# Patient Record
Sex: FEMALE | Race: WHITE | NOT HISPANIC OR LATINO | Employment: UNEMPLOYED | URBAN - METROPOLITAN AREA
[De-identification: names, ages, dates, MRNs, and addresses within clinical notes are randomized per-mention and may not be internally consistent; named-entity substitution may affect disease eponyms.]

---

## 2019-01-16 ENCOUNTER — TRANSCRIBE ORDERS (OUTPATIENT)
Dept: ADMINISTRATIVE | Facility: HOSPITAL | Age: 59
End: 2019-01-16

## 2019-01-16 DIAGNOSIS — Z12.39 SCREENING BREAST EXAMINATION: Primary | ICD-10-CM

## 2019-01-22 ENCOUNTER — HOSPITAL ENCOUNTER (OUTPATIENT)
Dept: MAMMOGRAPHY | Facility: HOSPITAL | Age: 59
Discharge: HOME | End: 2019-01-22
Payer: COMMERCIAL

## 2019-01-22 VITALS — BODY MASS INDEX: 39.56 KG/M2 | WEIGHT: 215 LBS | HEIGHT: 62 IN

## 2019-01-22 DIAGNOSIS — Z12.39 SCREENING BREAST EXAMINATION: ICD-10-CM

## 2019-01-22 PROCEDURE — 77067 SCR MAMMO BI INCL CAD: CPT

## 2019-01-28 ENCOUNTER — HOSPITAL ENCOUNTER (OUTPATIENT)
Dept: MAMMOGRAPHY | Facility: CLINIC | Age: 59
Discharge: HOME | End: 2019-01-28
Payer: COMMERCIAL

## 2019-01-28 ENCOUNTER — HOSPITAL ENCOUNTER (OUTPATIENT)
Dept: ULTRASOUND IMAGING | Facility: CLINIC | Age: 59
Discharge: HOME | End: 2019-01-28
Payer: COMMERCIAL

## 2019-01-28 VITALS — WEIGHT: 215 LBS | BODY MASS INDEX: 39.56 KG/M2 | HEIGHT: 62 IN

## 2019-01-28 DIAGNOSIS — R92.8 ABNORMAL MAMMOGRAM: ICD-10-CM

## 2019-01-28 PROCEDURE — G0279 TOMOSYNTHESIS, MAMMO: HCPCS

## 2019-01-28 PROCEDURE — 76642 ULTRASOUND BREAST LIMITED: CPT

## 2019-01-28 PROCEDURE — 77065 DX MAMMO INCL CAD UNI: CPT

## 2023-09-17 ENCOUNTER — HOSPITAL ENCOUNTER (EMERGENCY)
Facility: HOSPITAL | Age: 63
Discharge: HOME | End: 2023-09-17
Attending: EMERGENCY MEDICINE
Payer: COMMERCIAL

## 2023-09-17 ENCOUNTER — APPOINTMENT (EMERGENCY)
Dept: RADIOLOGY | Facility: HOSPITAL | Age: 63
End: 2023-09-17
Payer: COMMERCIAL

## 2023-09-17 VITALS
HEART RATE: 67 BPM | RESPIRATION RATE: 20 BRPM | BODY MASS INDEX: 46.09 KG/M2 | OXYGEN SATURATION: 97 % | SYSTOLIC BLOOD PRESSURE: 174 MMHG | WEIGHT: 251.99 LBS | DIASTOLIC BLOOD PRESSURE: 84 MMHG | TEMPERATURE: 97.9 F

## 2023-09-17 DIAGNOSIS — R53.1 GENERALIZED WEAKNESS: Primary | ICD-10-CM

## 2023-09-17 DIAGNOSIS — R03.0 ELEVATED BLOOD PRESSURE READING: ICD-10-CM

## 2023-09-17 LAB
ALBUMIN SERPL BCP-MCNC: 4.2 G/DL (ref 3.5–5)
ALP SERPL-CCNC: 111 U/L (ref 34–104)
ALT SERPL W P-5'-P-CCNC: 19 U/L (ref 7–52)
ANION GAP SERPL CALCULATED.3IONS-SCNC: 10 MMOL/L
AST SERPL W P-5'-P-CCNC: 25 U/L (ref 13–39)
BASOPHILS # BLD AUTO: 0.05 THOUSANDS/ÂΜL (ref 0–0.1)
BASOPHILS NFR BLD AUTO: 1 % (ref 0–1)
BILIRUB SERPL-MCNC: 0.5 MG/DL (ref 0.2–1)
BILIRUB UR QL STRIP: NEGATIVE
BUN SERPL-MCNC: 10 MG/DL (ref 5–25)
CALCIUM SERPL-MCNC: 9.3 MG/DL (ref 8.4–10.2)
CARDIAC TROPONIN I PNL SERPL HS: <2 NG/L
CHLORIDE SERPL-SCNC: 102 MMOL/L (ref 96–108)
CLARITY UR: CLEAR
CO2 SERPL-SCNC: 26 MMOL/L (ref 21–32)
COLOR UR: COLORLESS
CREAT SERPL-MCNC: 0.8 MG/DL (ref 0.6–1.3)
EOSINOPHIL # BLD AUTO: 0.11 THOUSAND/ÂΜL (ref 0–0.61)
EOSINOPHIL NFR BLD AUTO: 1 % (ref 0–6)
ERYTHROCYTE [DISTWIDTH] IN BLOOD BY AUTOMATED COUNT: 15.8 % (ref 11.6–15.1)
FLUAV RNA RESP QL NAA+PROBE: NEGATIVE
FLUBV RNA RESP QL NAA+PROBE: NEGATIVE
GFR SERPL CREATININE-BSD FRML MDRD: 80 ML/MIN/1.73SQ M
GLUCOSE SERPL-MCNC: 151 MG/DL (ref 65–140)
GLUCOSE UR STRIP-MCNC: NEGATIVE MG/DL
HCT VFR BLD AUTO: 42.4 % (ref 34.8–46.1)
HGB BLD-MCNC: 13.2 G/DL (ref 11.5–15.4)
HGB UR QL STRIP.AUTO: NEGATIVE
IMM GRANULOCYTES # BLD AUTO: 0.08 THOUSAND/UL (ref 0–0.2)
IMM GRANULOCYTES NFR BLD AUTO: 1 % (ref 0–2)
KETONES UR STRIP-MCNC: NEGATIVE MG/DL
LEUKOCYTE ESTERASE UR QL STRIP: NEGATIVE
LYMPHOCYTES # BLD AUTO: 1.96 THOUSANDS/ÂΜL (ref 0.6–4.47)
LYMPHOCYTES NFR BLD AUTO: 19 % (ref 14–44)
MCH RBC QN AUTO: 25.6 PG (ref 26.8–34.3)
MCHC RBC AUTO-ENTMCNC: 31.1 G/DL (ref 31.4–37.4)
MCV RBC AUTO: 82 FL (ref 82–98)
MONOCYTES # BLD AUTO: 0.51 THOUSAND/ÂΜL (ref 0.17–1.22)
MONOCYTES NFR BLD AUTO: 5 % (ref 4–12)
NEUTROPHILS # BLD AUTO: 7.43 THOUSANDS/ÂΜL (ref 1.85–7.62)
NEUTS SEG NFR BLD AUTO: 73 % (ref 43–75)
NITRITE UR QL STRIP: NEGATIVE
NRBC BLD AUTO-RTO: 0 /100 WBCS
PH UR STRIP.AUTO: 5.5 [PH]
PLATELET # BLD AUTO: 313 THOUSANDS/UL (ref 149–390)
PMV BLD AUTO: 8.6 FL (ref 8.9–12.7)
POTASSIUM SERPL-SCNC: 3.8 MMOL/L (ref 3.5–5.3)
PROT SERPL-MCNC: 8.2 G/DL (ref 6.4–8.4)
PROT UR STRIP-MCNC: NEGATIVE MG/DL
RBC # BLD AUTO: 5.15 MILLION/UL (ref 3.81–5.12)
RSV RNA RESP QL NAA+PROBE: NEGATIVE
SARS-COV-2 RNA RESP QL NAA+PROBE: NEGATIVE
SODIUM SERPL-SCNC: 138 MMOL/L (ref 135–147)
SP GR UR STRIP.AUTO: 1 (ref 1–1.03)
UROBILINOGEN UR STRIP-ACNC: <2 MG/DL
WBC # BLD AUTO: 10.14 THOUSAND/UL (ref 4.31–10.16)

## 2023-09-17 PROCEDURE — 85025 COMPLETE CBC W/AUTO DIFF WBC: CPT | Performed by: EMERGENCY MEDICINE

## 2023-09-17 PROCEDURE — 84484 ASSAY OF TROPONIN QUANT: CPT | Performed by: EMERGENCY MEDICINE

## 2023-09-17 PROCEDURE — 99285 EMERGENCY DEPT VISIT HI MDM: CPT | Performed by: EMERGENCY MEDICINE

## 2023-09-17 PROCEDURE — 99285 EMERGENCY DEPT VISIT HI MDM: CPT

## 2023-09-17 PROCEDURE — 80053 COMPREHEN METABOLIC PANEL: CPT | Performed by: EMERGENCY MEDICINE

## 2023-09-17 PROCEDURE — 0241U HB NFCT DS VIR RESP RNA 4 TRGT: CPT | Performed by: EMERGENCY MEDICINE

## 2023-09-17 PROCEDURE — 93005 ELECTROCARDIOGRAM TRACING: CPT

## 2023-09-17 PROCEDURE — 81003 URINALYSIS AUTO W/O SCOPE: CPT | Performed by: EMERGENCY MEDICINE

## 2023-09-17 PROCEDURE — 71045 X-RAY EXAM CHEST 1 VIEW: CPT

## 2023-09-17 PROCEDURE — 36415 COLL VENOUS BLD VENIPUNCTURE: CPT

## 2023-09-17 NOTE — ED PROVIDER NOTES
History  Chief Complaint   Patient presents with   • Weakness - Generalized     Reports playing [employer] about an hour ago, started to feel lightheaded and weak. Pt did not pass out. Pt reports she ate and drank this morning. Patient is a 82-year-old female with a history of hyperlipidemia and diabetes who presents with a headedness. Patient states that she felt well upon waking up this morning. She was driving to a corn hole tournament when she developed fatigue and lightheadedness. She states that her symptoms worsened upon arriving at the event. She denies loss of consciousness. Admits to heaviness in both arms, but denies focal weakness. She also denies numbness, tingling, chest pain, shortness of breath, diaphoresis, other concerns. She has not had similar symptoms previously. She states her appetite has been normal.      History provided by:  Patient  Dizziness  Quality:  Lightheadedness  Severity:  Mild  Timing:  Constant  Progression:  Improving  Chronicity:  New  Ineffective treatments:  None tried  Associated symptoms: weakness (generalized)    Associated symptoms: no chest pain, no diarrhea, no headaches, no nausea, no palpitations, no shortness of breath and no vomiting        None       History reviewed. No pertinent past medical history. Past Surgical History:   Procedure Laterality Date   • BREAST BIOPSY         History reviewed. No pertinent family history. I have reviewed and agree with the history as documented. E-Cigarette/Vaping   • E-Cigarette Use Never User      E-Cigarette/Vaping Substances     Social History     Tobacco Use   • Smoking status: Never   • Smokeless tobacco: Never   Vaping Use   • Vaping Use: Never used   Substance Use Topics   • Alcohol use: Not Currently   • Drug use: Never       Review of Systems   Constitutional: Negative for chills, diaphoresis and fever. HENT: Negative for nosebleeds, sore throat and trouble swallowing.     Eyes: Negative for photophobia, pain and visual disturbance. Respiratory: Negative for cough, chest tightness and shortness of breath. Cardiovascular: Negative for chest pain, palpitations and leg swelling. Gastrointestinal: Negative for abdominal pain, constipation, diarrhea, nausea and vomiting. Endocrine: Negative for polydipsia and polyuria. Genitourinary: Negative for difficulty urinating, dysuria, hematuria, pelvic pain, vaginal bleeding and vaginal discharge. Musculoskeletal: Negative for back pain, neck pain and neck stiffness. Skin: Negative for pallor and rash. Neurological: Positive for weakness (generalized) and light-headedness. Negative for dizziness, seizures and headaches. All other systems reviewed and are negative. Physical Exam  Physical Exam  Vitals and nursing note reviewed. Constitutional:       General: She is not in acute distress. Appearance: She is well-developed. HENT:      Head: Normocephalic and atraumatic. Eyes:      Pupils: Pupils are equal, round, and reactive to light. Cardiovascular:      Rate and Rhythm: Normal rate and regular rhythm. Pulses: Normal pulses. Heart sounds: Normal heart sounds. Pulmonary:      Effort: Pulmonary effort is normal. No respiratory distress. Breath sounds: Normal breath sounds. Abdominal:      General: There is no distension. Palpations: Abdomen is soft. Abdomen is not rigid. Tenderness: There is no abdominal tenderness. There is no guarding or rebound. Musculoskeletal:         General: No tenderness. Normal range of motion. Cervical back: Normal range of motion and neck supple. Lymphadenopathy:      Cervical: No cervical adenopathy. Skin:     General: Skin is warm and dry. Capillary Refill: Capillary refill takes less than 2 seconds. Neurological:      Mental Status: She is alert and oriented to person, place, and time. Cranial Nerves: No cranial nerve deficit.       Sensory: No sensory deficit. Motor: Motor function is intact. Gait: Gait is intact. Comments: Normal cerebellar exam.  Speech fluent. Visual fields intact. Vital Signs  ED Triage Vitals   Temperature Pulse Respirations Blood Pressure SpO2   09/17/23 1246 09/17/23 1245 09/17/23 1245 09/17/23 1245 09/17/23 1245   97.9 °F (36.6 °C) 67 20 (!) 174/84 97 %      Temp Source Heart Rate Source Patient Position - Orthostatic VS BP Location FiO2 (%)   09/17/23 1246 09/17/23 1245 -- 09/17/23 1245 --   Oral Monitor  Right arm       Pain Score       09/17/23 1245       No Pain           Vitals:    09/17/23 1245   BP: (!) 174/84   Pulse: 67         Visual Acuity      ED Medications  Medications - No data to display    Diagnostic Studies  Results Reviewed     Procedure Component Value Units Date/Time    FLU/RSV/COVID - if FLU/RSV clinically relevant [[id number]]  (Normal) Collected: 09/17/23 1325    Lab Status: Final result Specimen: Nares from Nose Updated: 09/17/23 1423     SARS-CoV-2 Negative     INFLUENZA A PCR Negative     INFLUENZA B PCR Negative     RSV PCR Negative    Narrative:      FOR PEDIATRIC PATIENTS - copy/paste COVID Guidelines URL to browser: [email or web address]    SARS-CoV-2 assay is a Nucleic Acid Amplification assay intended for the  qualitative detection of nucleic acid from SARS-CoV-2 in nasopharyngeal  swabs. Results are for the presumptive identification of SARS-CoV-2 RNA. Positive results are indicative of infection with SARS-CoV-2, the virus  causing COVID-19, but do not rule out bacterial infection or co-infection  with other viruses. Laboratories within the [facility] and its  territories are required to report all positive results to the appropriate  public health authorities. Negative results do not preclude SARS-CoV-2  infection and should not be used as the sole basis for treatment or other  patient management decisions.  Negative results must be combined with  clinical observations, patient history, and epidemiological information. This test has not been FDA cleared or approved. This test has been authorized by FDA under an Emergency Use Authorization  (EUA). This test is only authorized for the duration of time the  declaration that circumstances exist justifying the authorization of the  emergency use of an in vitro diagnostic tests for detection of SARS-CoV-2  virus and/or diagnosis of COVID-19 infection under section 564(b)(1) of  the Act, 21 U. S.C. 063EGF-1(L)(2), unless the authorization is terminated  or revoked sooner. The test has been validated but independent review by FDA  and CLIA is pending. Test performed using [employer] GeneXpert: This RT-PCR assay targets N2,  a region unique to SARS-CoV-2. A conserved region in the E-gene was chosen  for pan-Sarbecovirus detection which includes SARS-CoV-2. According to CMS-2020-01-R, this platform meets the definition of high-throughput technology.     UA (URINE) with reflex to Scope [[id number]] Collected: 09/17/23 1325    Lab Status: Final result Specimen: Urine, Clean Catch Updated: 09/17/23 1347     Color, UA Colorless     Clarity, UA Clear     Specific Gravity, UA 1.005     pH, UA 5.5     Leukocytes, UA Negative     Nitrite, UA Negative     Protein, UA Negative mg/dl      Glucose, UA Negative mg/dl      Ketones, UA Negative mg/dl      Urobilinogen, UA <2.0 mg/dl      Bilirubin, UA Negative     Occult Blood, UA Negative    HS Troponin 0hr (reflex protocol) [[id number]]  (Normal) Collected: 09/17/23 1300    Lab Status: Final result Specimen: Blood from Arm, Left Updated: 09/17/23 1338     hs TnI 0hr <2 ng/L     Comprehensive metabolic panel [[id number]]  (Abnormal) Collected: 09/17/23 1300    Lab Status: Final result Specimen: Blood from Arm, Left Updated: 09/17/23 1330     Sodium 138 mmol/L      Potassium 3.8 mmol/L      Chloride 102 mmol/L      CO2 26 mmol/L      ANION GAP 10 mmol/L BUN 10 mg/dL      Creatinine 0.80 mg/dL      Glucose 151 mg/dL      Calcium 9.3 mg/dL      AST 25 U/L      ALT 19 U/L      Alkaline Phosphatase 111 U/L      Total Protein 8.2 g/dL      Albumin 4.2 g/dL      Total Bilirubin 0.50 mg/dL      eGFR 80 ml/min/1.73sq m     Narrative:      National Kidney Disease Foundation guidelines for Chronic Kidney Disease (CKD):   •  Stage 1 with normal or high GFR (GFR > 90 mL/min/1.73 square meters)  •  Stage 2 Mild CKD (GFR = 60-89 mL/min/1.73 square meters)  •  Stage 3A Moderate CKD (GFR = 45-59 mL/min/1.73 square meters)  •  Stage 3B Moderate CKD (GFR = 30-44 mL/min/1.73 square meters)  •  Stage 4 Severe CKD (GFR = 15-29 mL/min/1.73 square meters)  •  Stage 5 End Stage CKD (GFR <15 mL/min/1.73 square meters)  Note: GFR calculation is accurate only with a steady state creatinine    CBC and differential [[id number]]  (Abnormal) Collected: 09/17/23 1300    Lab Status: Final result Specimen: Blood from Arm, Left Updated: 09/17/23 1318     WBC 10.14 Thousand/uL      RBC 5.15 Million/uL      Hemoglobin 13.2 g/dL      Hematocrit 42.4 %      MCV 82 fL      MCH 25.6 pg      MCHC 31.1 g/dL      RDW 15.8 %      MPV 8.6 fL      Platelets 417 Thousands/uL      nRBC 0 /100 WBCs      Neutrophils Relative 73 %      Immat GRANS % 1 %      Lymphocytes Relative 19 %      Monocytes Relative 5 %      Eosinophils Relative 1 %      Basophils Relative 1 %      Neutrophils Absolute 7.43 Thousands/µL      Immature Grans Absolute 0.08 Thousand/uL      Lymphocytes Absolute 1.96 Thousands/µL      Monocytes Absolute 0.51 Thousand/µL      Eosinophils Absolute 0.11 Thousand/µL      Basophils Absolute 0.05 Thousands/µL                  XR chest 1 view portable   Final Result by Bernie Cast MD (09/18 1469)      No acute cardiopulmonary disease.       Findings are stable            Workstation performed: [id number]                    Procedures  ECG 12 Lead Documentation Only    Date/Time: 9/17/2023 2:36 PM    Performed by: Mariama Ramirez DO  Authorized by: Mariama Ramirez DO    ECG reviewed by me, the ED Provider: yes    Patient location:  ED  Previous ECG:     Previous ECG:  Compared to current    Similarity:  No change    Comparison to cardiac monitor: Yes    Comments:      Normal sinus rhythm at a rate of 66 bpm.  Normal intervals. Normal axis. Normal QRS. Nonspecific ST-T wave abnormalities. No old for comparison. ED Course                                             Medical Decision Making  Patient presents with an episode of lightheadedness and fatigue. Patient describes bilateral arm weakness, but I did not find any objective weakness on exam.  She has a nonfocal neuro exam, including cerebellar exam.  She ambulates with a steady gait and has normal finger-to-nose. Do not believe that symptoms are consistent with TIA/CVA. Also, no evidence of infectious or metabolic derangement. She does have elevated blood pressure readings in the emergency department. She does not have a history of hypertension. I recommended that she follow-up with her PCP for blood pressure checks. No evidence of endorgan dysfunction in the emergency department. Her symptoms may also be consistent with viral syndrome. She is tolerating p.o. fluids and ambulating without difficulty. She is appropriate for discharge and outpatient follow-up. She agrees to return to ED if symptoms worsen. Portions of the above record have been created with voice recognition software.  Occasional wrong word or "sound alike" substitutions may have occurred due to the inherent limitations of voice recognition software.  Read the chart carefully and recognize, using context, where substitutions may have occurred. Elevated blood pressure reading:     Details: No history of HTN. No evidence of end organ dysfunction. Outpatient follow up is appropriate.  Return to ED if worsening lightheadedness or development of chest pain, shortness of breath, other concerns. Generalized weakness:     Details: No focal weakness. No evidence of infectious or matabolic process. Patient is well appearing and stable for discharge. Amount and/or Complexity of Data Reviewed  External Data Reviewed: notes. Labs: ordered. Radiology: ordered. ECG/medicine tests: ordered and independent interpretation performed. Risk  OTC drugs. Disposition  Final diagnoses:   Generalized weakness   Elevated blood pressure reading     Time reflects when diagnosis was documented in both MDM as applicable and the Disposition within this note     Time User Action Codes Description Comment    9/17/2023  2:33 PM Jim PETE Add [R53.1] Generalized weakness     9/17/2023  2:33 PM Jim PETE Add [R03.0] Elevated blood pressure reading       ED Disposition     ED Disposition   Discharge    Condition   Stable    Date/Time   Sun Sep 17, 2023  2:33 PM    Comment   Napoleon Askew discharge to home/self care. Follow-up Information     Follow up With Specialties Details Why Contact Info    Portillo Beverly MD Internal Medicine Schedule an appointment as soon as possible for a visit  Return to ED sooner if symptoms worsen or persist or you develop speech difficulty, unilateral weakness, other concerns. [address] [place] [address]   [phone]            There are no discharge medications for this patient. No discharge procedures on file.     PDMP Review     None          ED Provider  Electronically Signed by           Sada Gramajo DO  09/18/23 1007

## 2023-09-18 LAB
ATRIAL RATE: 66 BPM
P AXIS: 60 DEGREES
PR INTERVAL: 138 MS
QRS AXIS: 48 DEGREES
QRSD INTERVAL: 80 MS
QT INTERVAL: 400 MS
QTC INTERVAL: 419 MS
T WAVE AXIS: 4 DEGREES
VENTRICULAR RATE: 66 BPM

## 2023-09-18 PROCEDURE — 93010 ELECTROCARDIOGRAM REPORT: CPT | Performed by: INTERNAL MEDICINE

## 2023-09-20 ENCOUNTER — HOSPITAL ENCOUNTER (OUTPATIENT)
Facility: HOSPITAL | Age: 63
Setting detail: OBSERVATION
Discharge: HOME | End: 2023-09-21
Attending: EMERGENCY MEDICINE | Admitting: INTERNAL MEDICINE
Payer: COMMERCIAL

## 2023-09-20 ENCOUNTER — APPOINTMENT (EMERGENCY)
Dept: CT IMAGING | Facility: HOSPITAL | Age: 63
End: 2023-09-20
Payer: COMMERCIAL

## 2023-09-20 DIAGNOSIS — R41.0 DISORIENTATION: ICD-10-CM

## 2023-09-20 DIAGNOSIS — E78.5 DYSLIPIDEMIA: ICD-10-CM

## 2023-09-20 DIAGNOSIS — I10 HYPERTENSION: ICD-10-CM

## 2023-09-20 DIAGNOSIS — R73.9 BLOOD GLUCOSE ELEVATED: ICD-10-CM

## 2023-09-20 DIAGNOSIS — R03.0 TRANSIENT ELEVATED BLOOD PRESSURE: ICD-10-CM

## 2023-09-20 DIAGNOSIS — H55.00 NYSTAGMUS: Primary | ICD-10-CM

## 2023-09-20 DIAGNOSIS — R55 NEAR SYNCOPE: ICD-10-CM

## 2023-09-20 DIAGNOSIS — R26.81 UNSTEADY GAIT WHEN WALKING: ICD-10-CM

## 2023-09-20 DIAGNOSIS — R03.0 ELEVATED BLOOD PRESSURE READING: ICD-10-CM

## 2023-09-20 PROBLEM — E11.9 DIABETES MELLITUS WITHOUT COMPLICATION (HCC): Status: ACTIVE | Noted: 2023-05-10

## 2023-09-20 PROBLEM — R41.82 AMS (ALTERED MENTAL STATUS): Status: ACTIVE | Noted: 2023-09-20

## 2023-09-20 LAB
ALBUMIN SERPL BCP-MCNC: 4 G/DL (ref 3.5–5)
ALP SERPL-CCNC: 112 U/L (ref 34–104)
ALT SERPL W P-5'-P-CCNC: 16 U/L (ref 7–52)
AMPHETAMINES SERPL QL SCN: NEGATIVE
ANION GAP SERPL CALCULATED.3IONS-SCNC: 10 MMOL/L
AST SERPL W P-5'-P-CCNC: 26 U/L (ref 13–39)
ATRIAL RATE: 69 BPM
BARBITURATES UR QL: NEGATIVE
BASOPHILS # BLD AUTO: 0.06 THOUSANDS/ÂΜL (ref 0–0.1)
BASOPHILS NFR BLD AUTO: 1 % (ref 0–1)
BENZODIAZ UR QL: NEGATIVE
BILIRUB SERPL-MCNC: 0.45 MG/DL (ref 0.2–1)
BILIRUB UR QL STRIP: NEGATIVE
BUN SERPL-MCNC: 8 MG/DL (ref 5–25)
CALCIUM SERPL-MCNC: 9 MG/DL (ref 8.4–10.2)
CARDIAC TROPONIN I PNL SERPL HS: <2 NG/L
CARDIAC TROPONIN I PNL SERPL HS: <2 NG/L
CHLORIDE SERPL-SCNC: 102 MMOL/L (ref 96–108)
CLARITY UR: CLEAR
CO2 SERPL-SCNC: 26 MMOL/L (ref 21–32)
COCAINE UR QL: NEGATIVE
COLOR UR: COLORLESS
CREAT SERPL-MCNC: 0.8 MG/DL (ref 0.6–1.3)
EOSINOPHIL # BLD AUTO: 0.26 THOUSAND/ÂΜL (ref 0–0.61)
EOSINOPHIL NFR BLD AUTO: 3 % (ref 0–6)
ERYTHROCYTE [DISTWIDTH] IN BLOOD BY AUTOMATED COUNT: 15.8 % (ref 11.6–15.1)
EXT PREGNANCY TEST URINE: NEGATIVE
EXT. CONTROL: NORMAL
GFR SERPL CREATININE-BSD FRML MDRD: 80 ML/MIN/1.73SQ M
GLUCOSE SERPL-MCNC: 141 MG/DL (ref 65–140)
GLUCOSE SERPL-MCNC: 197 MG/DL (ref 65–140)
GLUCOSE SERPL-MCNC: 67 MG/DL (ref 65–140)
GLUCOSE UR STRIP-MCNC: NEGATIVE MG/DL
HCT VFR BLD AUTO: 42.6 % (ref 34.8–46.1)
HGB BLD-MCNC: 13.1 G/DL (ref 11.5–15.4)
HGB UR QL STRIP.AUTO: NEGATIVE
IMM GRANULOCYTES # BLD AUTO: 0.1 THOUSAND/UL (ref 0–0.2)
IMM GRANULOCYTES NFR BLD AUTO: 1 % (ref 0–2)
KETONES UR STRIP-MCNC: NEGATIVE MG/DL
LEUKOCYTE ESTERASE UR QL STRIP: NEGATIVE
LYMPHOCYTES # BLD AUTO: 2.13 THOUSANDS/ÂΜL (ref 0.6–4.47)
LYMPHOCYTES NFR BLD AUTO: 21 % (ref 14–44)
MCH RBC QN AUTO: 25.8 PG (ref 26.8–34.3)
MCHC RBC AUTO-ENTMCNC: 30.8 G/DL (ref 31.4–37.4)
MCV RBC AUTO: 84 FL (ref 82–98)
METHADONE UR QL: NEGATIVE
MONOCYTES # BLD AUTO: 0.55 THOUSAND/ÂΜL (ref 0.17–1.22)
MONOCYTES NFR BLD AUTO: 5 % (ref 4–12)
NEUTROPHILS # BLD AUTO: 7.23 THOUSANDS/ÂΜL (ref 1.85–7.62)
NEUTS SEG NFR BLD AUTO: 69 % (ref 43–75)
NITRITE UR QL STRIP: NEGATIVE
NRBC BLD AUTO-RTO: 0 /100 WBCS
OPIATES UR QL SCN: NEGATIVE
OXYCODONE+OXYMORPHONE UR QL SCN: NEGATIVE
P AXIS: 71 DEGREES
PCP UR QL: NEGATIVE
PH UR STRIP.AUTO: 5.5 [PH]
PLATELET # BLD AUTO: 314 THOUSANDS/UL (ref 149–390)
PMV BLD AUTO: 8.9 FL (ref 8.9–12.7)
POTASSIUM SERPL-SCNC: 4 MMOL/L (ref 3.5–5.3)
PR INTERVAL: 142 MS
PROT SERPL-MCNC: 8 G/DL (ref 6.4–8.4)
PROT UR STRIP-MCNC: NEGATIVE MG/DL
QRS AXIS: 53 DEGREES
QRSD INTERVAL: 82 MS
QT INTERVAL: 414 MS
QTC INTERVAL: 443 MS
RBC # BLD AUTO: 5.08 MILLION/UL (ref 3.81–5.12)
SODIUM SERPL-SCNC: 138 MMOL/L (ref 135–147)
SP GR UR STRIP.AUTO: 1 (ref 1–1.03)
T WAVE AXIS: 15 DEGREES
THC UR QL: NEGATIVE
UROBILINOGEN UR STRIP-ACNC: <2 MG/DL
VENTRICULAR RATE: 69 BPM
WBC # BLD AUTO: 10.33 THOUSAND/UL (ref 4.31–10.16)

## 2023-09-20 PROCEDURE — 80307 DRUG TEST PRSMV CHEM ANLYZR: CPT

## 2023-09-20 PROCEDURE — 36415 COLL VENOUS BLD VENIPUNCTURE: CPT

## 2023-09-20 PROCEDURE — 99223 1ST HOSP IP/OBS HIGH 75: CPT | Performed by: GENERAL PRACTICE

## 2023-09-20 PROCEDURE — 93005 ELECTROCARDIOGRAM TRACING: CPT

## 2023-09-20 PROCEDURE — G1004 CDSM NDSC: HCPCS

## 2023-09-20 PROCEDURE — 99285 EMERGENCY DEPT VISIT HI MDM: CPT | Performed by: EMERGENCY MEDICINE

## 2023-09-20 PROCEDURE — 93010 ELECTROCARDIOGRAM REPORT: CPT | Performed by: INTERNAL MEDICINE

## 2023-09-20 PROCEDURE — 80053 COMPREHEN METABOLIC PANEL: CPT | Performed by: EMERGENCY MEDICINE

## 2023-09-20 PROCEDURE — 99285 EMERGENCY DEPT VISIT HI MDM: CPT

## 2023-09-20 PROCEDURE — 82607 VITAMIN B-12: CPT

## 2023-09-20 PROCEDURE — 82948 REAGENT STRIP/BLOOD GLUCOSE: CPT

## 2023-09-20 PROCEDURE — 84484 ASSAY OF TROPONIN QUANT: CPT | Performed by: EMERGENCY MEDICINE

## 2023-09-20 PROCEDURE — 70496 CT ANGIOGRAPHY HEAD: CPT

## 2023-09-20 PROCEDURE — 85025 COMPLETE CBC W/AUTO DIFF WBC: CPT | Performed by: EMERGENCY MEDICINE

## 2023-09-20 PROCEDURE — 81025 URINE PREGNANCY TEST: CPT

## 2023-09-20 PROCEDURE — 81003 URINALYSIS AUTO W/O SCOPE: CPT

## 2023-09-20 PROCEDURE — 70498 CT ANGIOGRAPHY NECK: CPT

## 2023-09-20 PROCEDURE — 82746 ASSAY OF FOLIC ACID SERUM: CPT

## 2023-09-20 RX ORDER — ATORVASTATIN CALCIUM 10 MG/1
10 TABLET, FILM COATED ORAL DAILY
Status: DISCONTINUED | OUTPATIENT
Start: 2023-09-21 | End: 2023-09-20

## 2023-09-20 RX ORDER — ATORVASTATIN CALCIUM 10 MG/1
10 TABLET, FILM COATED ORAL DAILY
COMMUNITY
End: 2023-09-21

## 2023-09-20 RX ORDER — ENOXAPARIN SODIUM 100 MG/ML
40 INJECTION SUBCUTANEOUS EVERY 12 HOURS SCHEDULED
Status: DISCONTINUED | OUTPATIENT
Start: 2023-09-20 | End: 2023-09-21 | Stop reason: HOSPADM

## 2023-09-20 RX ORDER — ASPIRIN 81 MG/1
81 TABLET, CHEWABLE ORAL DAILY
Status: DISCONTINUED | OUTPATIENT
Start: 2023-09-21 | End: 2023-09-21 | Stop reason: HOSPADM

## 2023-09-20 RX ORDER — ATORVASTATIN CALCIUM 40 MG/1
40 TABLET, FILM COATED ORAL EVERY EVENING
Status: DISCONTINUED | OUTPATIENT
Start: 2023-09-20 | End: 2023-09-21 | Stop reason: HOSPADM

## 2023-09-20 RX ORDER — INSULIN LISPRO 100 [IU]/ML
1-6 INJECTION, SOLUTION INTRAVENOUS; SUBCUTANEOUS
Status: DISCONTINUED | OUTPATIENT
Start: 2023-09-20 | End: 2023-09-21 | Stop reason: HOSPADM

## 2023-09-20 RX ADMIN — ENOXAPARIN SODIUM 40 MG: 40 INJECTION SUBCUTANEOUS at 21:25

## 2023-09-20 RX ADMIN — IOHEXOL 85 ML: 350 INJECTION, SOLUTION INTRAVENOUS at 14:04

## 2023-09-20 NOTE — H&P
[address] [facility]  H&P  Name: Damian Shukla 63 y.o. female I MRN: [id number]  Unit/Bed#: ED-22 I Date of Admission: 9/20/2023   Date of Service: 9/20/2023 I Hospital Day: 0      Assessment/Plan   AMS (altered mental status)  Assessment & Plan  Assessment:  · Patient reports feeling foggy with unsteady gait  · CT head: Unremarkable  · Recent lipid panel in August 2023 showed elevated triglycerides at 186 and LDL cholesterol at 100  · CBC was monstly unremarkable with WBC minimally elevated at 10.33  · Troponins at 0, 2 hour negative  · UDS, pregnancy test negative  · POA glucose of 197  · BP on admission was 187/79  · Foggy sensation could be related to elevated BP  · NSR on telemetry  · EKG showed NSR     Plan:  · Placed on stroke pathway  · Neurology consult  · Will order MRI brain  · Permissive hypertension for 24-48 hours  · Start statin 40 mg at night  · Start aspirin 81  · Will order vitamin b12, folate levels  · UA with reflex  · Continue to monitor on telemetry  · ECHO  · DVT prophylaxis with enoxaparin  · PT, OT      Transient elevated blood pressure  Assessment & Plan  Assessment:  · Patient had POA BP reading of 187/79  · Patient reports never having high BP  · A few weeks ago she had a regular check up and her BP was normal    Plan:  · Consider starting lisinopril 10 mg daily before discharge if stroke work-up unremarkable  · Continue to monitor BP    Blood glucose elevated  Assessment & Plan  Lab Results   Component Value Date    HGBA1C 6.3 (H) 08/07/2023       No results for input(s): "POCGLU" in the last 72 hours. Blood Sugar Average: Last 72 hrs:  · CMP shows glucose at 197    Plan:  · Initiate SSI  · Monitor glucose  · Will adjust insulin regiment if necessary       VTE Pharmacologic Prophylaxis: VTE Score: 2 Will start lovenox for DVT prophylaxis  Code Status: Level 1 - Full Code   Discussion with family: Patient declined call to .      Anticipated Length of Stay: Patient will be admitted on an observation basis with an anticipated length of stay of less than 2 midnights secondary to stroke rule out. Chief Complaint: Fatigue     History of Present Illness:  Мария Vogel is a 63 y.o. female with PMH significant for vertigo for which she received vestibular therapy who presents with altered mental status. On  at 11 am she was going to play corn hole but her arms felt heavy and she had brain fog, did not have vision changes but didn't feel like herself. Friend reported she looked pale. Her friends then helped her to the ground. She came to [facility] and the work-up was negative. Covid at the time was negative, x-ray was negative, UA was negative, lab work was unremarkable. Today she reports she was going to get a bagel with her daughter but her daughter said her eyes were shaking and that she was not focusing and extremely fatigued. She does not report feeling nauseous, lightheaded, headaches, or syncope. Currently she feels "woozy" and that something is off. Does not report any new stresses. Does not have any dysuria or constipation. States her diet is well rounded with meats and vegetables. She does not drink or smoke. Quit smoking 12 years ago. Reports her father and brother both had heart attacks and  in their 46s. In the ED work-up was unremarkable with negative troponins, lab work unremarkable other than mildly elevated glucose, and CT head found no acute intracranial abnormality. Review of Systems:  Review of Systems   Constitutional: Positive for activity change. Negative for appetite change. HENT: Negative. Negative for congestion, nosebleeds and sore throat. Eyes: Negative. Respiratory: Negative for choking, chest tightness and shortness of breath. Cardiovascular: Negative for chest pain, palpitations and leg swelling. Gastrointestinal: Positive for diarrhea. Negative for abdominal pain and vomiting. Endocrine: Negative. Genitourinary: Positive for pelvic pain and vaginal pain. Negative for decreased urine volume, difficulty urinating, dysuria, frequency, menstrual problem, urgency, vaginal bleeding and vaginal discharge. Musculoskeletal: Positive for back pain. Skin: Negative. Allergic/Immunologic: Negative. Neurological: Positive for dizziness and tremors. Negative for facial asymmetry, weakness, light-headedness, numbness and headaches. Psychiatric/Behavioral: Negative for confusion. Past Medical and Surgical History:   History reviewed. No pertinent past medical history. Past Surgical History:   Procedure Laterality Date   • BREAST BIOPSY         Meds/Allergies:  Prior to Admission medications    Medication Sig Start Date End Date Taking? Authorizing Provider   atorvastatin (LIPITOR) 10 mg tablet Take 10 mg by mouth daily   Yes Historical Provider, MD     I have reviewed home medications with patient personally. Allergies: No Known Allergies    Social History:  Marital Status: /Civil Union   Occupation: Does not work  Patient Pre-hospital Living Situation: Home  Patient Pre-hospital Level of Mobility: walks  Patient Pre-hospital Diet Restrictions: None  Substance Use History:   Social History     Substance and Sexual Activity   Alcohol Use Not Currently     Social History     Tobacco Use   Smoking Status Never   Smokeless Tobacco Never     Social History     Substance and Sexual Activity   Drug Use Never       Family History:  Brother and father had heart attacks and  in their early 46s    Physical Exam:     Vitals:   Blood Pressure: 164/79 (23 1623)  Pulse: 74 (23 1623)  Temperature: 98.1 °F (36.7 °C) (23 1216)  Respirations: 18 (23 1623)  SpO2: 97 % (23 1623)    Physical Exam  Vitals and nursing note reviewed. Constitutional:       General: She is not in acute distress. Appearance: She is obese. She is not ill-appearing, toxic-appearing or diaphoretic. HENT:      Head: Normocephalic. Nose: No congestion or rhinorrhea. Mouth/Throat:      Mouth: Mucous membranes are moist.      Pharynx: Oropharynx is clear. Eyes:      Extraocular Movements: Extraocular movements intact. Conjunctiva/sclera: Conjunctivae normal.      Comments: Nystagmus present, more prominent in left eye    There is right beating nystagmus on leftward deviation of the eyes   Cardiovascular:      Rate and Rhythm: Normal rate and regular rhythm. Pulmonary:      Effort: Pulmonary effort is normal. No respiratory distress. Breath sounds: Normal breath sounds. No wheezing, rhonchi or rales. Abdominal:      Palpations: Abdomen is soft. Tenderness: There is no abdominal tenderness. There is no right CVA tenderness, left CVA tenderness, guarding or rebound. Musculoskeletal:      Cervical back: No tenderness. Right lower leg: No edema. Left lower leg: No edema. Lymphadenopathy:      Cervical: No cervical adenopathy. Skin:     General: Skin is warm. Capillary Refill: Capillary refill takes less than 2 seconds. Findings: No lesion or rash. Neurological:      Mental Status: She is oriented to person, place, and time. Cranial Nerves: No cranial nerve deficit. Sensory: No sensory deficit.       Comments: Cranial nerves II through XII grossly intact  Sensation intact bilaterally in upper and lower extremities  Negative heel-to-shin  Negative pronator drift     Psychiatric:         Mood and Affect: Mood normal.         Behavior: Behavior normal.        Additional Data:     Lab Results:  Results from last 7 days   Lab Units 09/20/23  1227   WBC Thousand/uL 10.33*   HEMOGLOBIN g/dL 13.1   HEMATOCRIT % 42.6   PLATELETS Thousands/uL 314   NEUTROS PCT % 69   LYMPHS PCT % 21   MONOS PCT % 5   EOS PCT % 3     Results from last 7 days   Lab Units 09/20/23  1227   SODIUM mmol/L 138   POTASSIUM mmol/L 4.0   CHLORIDE mmol/L 102   CO2 mmol/L 26   BUN mg/dL 8 CREATININE mg/dL 0.80   ANION GAP mmol/L 10   CALCIUM mg/dL 9.0   ALBUMIN g/dL 4.0   TOTAL BILIRUBIN mg/dL 0.45   ALK PHOS U/L 112*   ALT U/L 16   AST U/L 26   GLUCOSE RANDOM mg/dL 197*                     Lines/Drains:  Invasive Devices     Peripheral Intravenous Line  Duration           Peripheral IV 09/20/23 Left Antecubital <1 day                Imaging: Reviewed radiology reports from this admission including: CT head  CTA head and neck with and without contrast   Final Result by ZARA Brock MD (09/20 5041)      No acute intracranial pathology. No significant stenosis of the cervical carotid or vertebral arteries. No significant intracranial stenosis, large vessel occlusion or aneurysm. Workstation performed: [id number]         MRI Inpatient Order    (Results Pending)       EKG and Other Studies Reviewed on Admission:   · EKG: NSR. HR 69.    ** Please Note: This note has been constructed using a voice recognition system.  **

## 2023-09-20 NOTE — ASSESSMENT & PLAN NOTE
Assessment:  · Patient reports feeling foggy with unsteady gait  · CT head: Unremarkable  · Recent lipid panel in August 2023 showed elevated triglycerides at 186 and LDL cholesterol at 100  · CBC was monstly unremarkable with WBC minimally elevated at 10.33  · Troponins at 0, 2 hour negative  · UDS, pregnancy test negative  · POA glucose of 197  · BP on admission was 187/79  · Foggy sensation could be related to elevated BP  · NSR on telemetry  · EKG showed NSR     Plan:  · Placed on stroke pathway  · Neurology consult  · Will order MRI brain  · Permissive hypertension for 24-48 hours  · Start statin 40 mg at night  · Start aspirin 81  · Will order vitamin b12, folate levels  · UA with reflex  · Continue to monitor on telemetry  · ECHO  · DVT prophylaxis with enoxaparin  · PT, OT

## 2023-09-20 NOTE — LETTER
Thank you for allowing us to participate in the care of your patient, Zaria Maldonado, who was hospitalized from 9/20/2023 through 9/21/2023 with the admitting diagnosis of fatigue and brain fog. We initiated stroke protocol with all labs and imaging resulting negative for any indication of stroke. She did however have elevated blood pressures throughout her stay and it is thought that may be this could be contributing to her symptoms. She was advised to begin taking lisinopril 5 mg daily and to follow-up with her PCP to reevaluate the need for long-term antihypertensives. If you have any additional questions or would like to discuss further, please feel free to contact me.     Jonas Paredes MD  [facility], Hospitalist  [phone]

## 2023-09-20 NOTE — ED PROVIDER NOTES
History  Chief Complaint   Patient presents with   • Altered Mental Status     Pt to ED with c/o feeling foggy about an hour ago and feeling weak. Seen on Sunday for similar episode     62 yo F no known past medical history, presenting to the emergency department for altered mental status per family. Patient states that she feels "foggy". Patient was seen and evaluated 4 days ago for similar symptoms. Patient was discharged home at that time. Patient's family states patient had a near syncopal episode while at a bagel shop. Patient family states patient is also been unsteady on her feet for last couple days. Patient states that her arms feel heavy, she feels fatigued. Patient's family notes that 20 years ago patient had a psychotic episode where she was hallucinating and they states this feels similar without the hallucinations. However patient has no memory of this. Patient denies fevers, chills, chest pain, shortness of breath, abdominal pain, headache, vision changes, focal weakness, neck pain, nausea, vomiting, dysuria, hematuria, diarrhea, constipation. Prior to Admission Medications   Prescriptions Last Dose Informant Patient Reported? Taking?   atorvastatin (LIPITOR) 10 mg tablet   Yes Yes   Sig: Take 10 mg by mouth daily      Facility-Administered Medications: None       History reviewed. No pertinent past medical history. Past Surgical History:   Procedure Laterality Date   • BREAST BIOPSY         History reviewed. No pertinent family history. I have reviewed and agree with the history as documented. E-Cigarette/Vaping   • E-Cigarette Use Never User      E-Cigarette/Vaping Substances     Social History     Tobacco Use   • Smoking status: Never   • Smokeless tobacco: Never   Vaping Use   • Vaping Use: Never used   Substance Use Topics   • Alcohol use: Not Currently   • Drug use: Never        Review of Systems   Constitutional: Positive for fatigue. Negative for chills and fever. +"foggy"   HENT: Negative for ear pain and sore throat. Eyes: Negative for pain and visual disturbance. Respiratory: Negative for cough and shortness of breath. Cardiovascular: Negative for chest pain, palpitations and leg swelling. Gastrointestinal: Negative for abdominal pain, constipation, diarrhea, nausea and vomiting. Genitourinary: Negative for dysuria and hematuria. Musculoskeletal: Negative for arthralgias, back pain, neck pain and neck stiffness. Skin: Negative for color change and rash. Neurological: Positive for light-headedness (near syncope). Negative for seizures, syncope, weakness and headaches. All other systems reviewed and are negative. Physical Exam  ED Triage Vitals   Temperature Pulse Respirations Blood Pressure SpO2   09/20/23 1216 09/20/23 1216 09/20/23 1216 09/20/23 1216 09/20/23 1216   98.1 °F (36.7 °C) 78 18 (!) 187/79 96 %      Temp src Heart Rate Source Patient Position - Orthostatic VS BP Location FiO2 (%)   -- 09/20/23 1330 09/20/23 1330 09/20/23 1330 --    Monitor Lying Right arm       Pain Score       09/20/23 1623       No Pain             Orthostatic Vital Signs  Vitals:    09/20/23 1400 09/20/23 1415 09/20/23 1430 09/20/23 1623   BP:    164/79   Pulse: 76 76 72 74   Patient Position - Orthostatic VS:    Sitting       Physical Exam  Vitals and nursing note reviewed. Constitutional:       General: She is not in acute distress. Appearance: She is well-developed. HENT:      Head: Normocephalic and atraumatic. Eyes:      General: Lids are normal.      Extraocular Movements: Extraocular movements intact. Right eye: Nystagmus present. Left eye: Nystagmus present. Conjunctiva/sclera: Conjunctivae normal.      Pupils: Pupils are equal, round, and reactive to light. Comments: Horizontal nystagmus but not fatigable on the left, fatigable on the right.   No vertical nystagmus   Cardiovascular:      Rate and Rhythm: Normal rate and regular rhythm. Heart sounds: No murmur heard. Pulmonary:      Effort: Pulmonary effort is normal. No respiratory distress. Breath sounds: Normal breath sounds. Abdominal:      Palpations: Abdomen is soft. Tenderness: There is no abdominal tenderness. Musculoskeletal:         General: No swelling. Cervical back: Neck supple. Skin:     General: Skin is warm and dry. Capillary Refill: Capillary refill takes less than 2 seconds. Neurological:      Mental Status: She is alert and oriented to person, place, and time. Cranial Nerves: Cranial nerves 2-12 are intact. Sensory: Sensation is intact. Motor: Motor function is intact. Coordination: Finger-Nose-Finger Test normal.      Gait: Gait abnormal (Slightly unsteady). Psychiatric:         Mood and Affect: Mood normal.         ED Medications  Medications   insulin lispro (HumaLOG) 100 units/mL subcutaneous injection 1-6 Units (has no administration in time range)   iohexol (OMNIPAQUE) 350 MG/ML injection (MULTI-DOSE) 85 mL (85 mL Intravenous Given 9/20/23 1404)       Diagnostic Studies  Results Reviewed     Procedure Component Value Units Date/Time    Folate [[id number]]     Lab Status: No result Specimen: Blood     Vitamin B12 [[id number]]     Lab Status: No result Specimen: Blood     Rapid drug screen, urine [[id number]] Collected: 09/20/23 1330    Lab Status:  In process Specimen: Urine Updated: 09/20/23 1641    HS Troponin I 2hr [[id number]] Collected: 09/20/23 1444    Lab Status: Final result Specimen: Blood from Arm, Right Updated: 09/20/23 1513     hs TnI 2hr <2 ng/L      Delta 2hr hsTnI --    POCT pregnancy, urine [[id number]]  (Normal) Resulted: 09/20/23 1335    Lab Status: Edited Result - FINAL Updated: 09/20/23 1436     EXT Preg Test, Ur Negative     Control Valid    UA w Reflex to Microscopic w Reflex to Culture [[id number]] Collected: 09/20/23 1330    Lab Status: Final result Specimen: Urine, Clean Catch Updated: 09/20/23 1349     Color, UA Colorless     Clarity, UA Clear     Specific Gravity, UA 1.004     pH, UA 5.5     Leukocytes, UA Negative     Nitrite, UA Negative     Protein, UA Negative mg/dl      Glucose, UA Negative mg/dl      Ketones, UA Negative mg/dl      Urobilinogen, UA <2.0 mg/dl      Bilirubin, UA Negative     Occult Blood, UA Negative    Comprehensive metabolic panel [[id number]]  (Abnormal) Collected: 09/20/23 1227    Lab Status: Final result Specimen: Blood from Arm, Left Updated: 09/20/23 1305     Sodium 138 mmol/L      Potassium 4.0 mmol/L      Chloride 102 mmol/L      CO2 26 mmol/L      ANION GAP 10 mmol/L      BUN 8 mg/dL      Creatinine 0.80 mg/dL      Glucose 197 mg/dL      Calcium 9.0 mg/dL      AST 26 U/L      ALT 16 U/L      Alkaline Phosphatase 112 U/L      Total Protein 8.0 g/dL      Albumin 4.0 g/dL      Total Bilirubin 0.45 mg/dL      eGFR 80 ml/min/1.73sq m     Narrative:      [place] guidelines for Chronic Kidney Disease (CKD):   •  Stage 1 with normal or high GFR (GFR > 90 mL/min/1.73 square meters)  •  Stage 2 Mild CKD (GFR = 60-89 mL/min/1.73 square meters)  •  Stage 3A Moderate CKD (GFR = 45-59 mL/min/1.73 square meters)  •  Stage 3B Moderate CKD (GFR = 30-44 mL/min/1.73 square meters)  •  Stage 4 Severe CKD (GFR = 15-29 mL/min/1.73 square meters)  •  Stage 5 End Stage CKD (GFR <15 mL/min/1.73 square meters)  Note: GFR calculation is accurate only with a steady state creatinine    HS Troponin 0hr (reflex protocol) [[id number]]  (Normal) Collected: 09/20/23 1227    Lab Status: Final result Specimen: Blood from Arm, Left Updated: 09/20/23 1304     hs TnI 0hr <2 ng/L     CBC and differential [[id number]]  (Abnormal) Collected: 09/20/23 1227    Lab Status: Final result Specimen: Blood from Arm, Left Updated: 09/20/23 1236     WBC 10.33 Thousand/uL      RBC 5.08 Million/uL      Hemoglobin 13.1 g/dL      Hematocrit 42.6 %      MCV 84 fL      MCH 25.8 pg      MCHC 30.8 g/dL      RDW 15.8 %      MPV 8.9 fL      Platelets 801 Thousands/uL      nRBC 0 /100 WBCs      Neutrophils Relative 69 %      Immat GRANS % 1 %      Lymphocytes Relative 21 %      Monocytes Relative 5 %      Eosinophils Relative 3 %      Basophils Relative 1 %      Neutrophils Absolute 7.23 Thousands/µL      Immature Grans Absolute 0.10 Thousand/uL      Lymphocytes Absolute 2.13 Thousands/µL      Monocytes Absolute 0.55 Thousand/µL      Eosinophils Absolute 0.26 Thousand/µL      Basophils Absolute 0.06 Thousands/µL                  CTA head and neck with and without contrast   Final Result by ZARA Beckham MD (09/20 2027)      No acute intracranial pathology. No significant stenosis of the cervical carotid or vertebral arteries. No significant intracranial stenosis, large vessel occlusion or aneurysm. Workstation performed: [id number]               Procedures  ECG 12 Lead Documentation Only    Date/Time: 9/20/2023 4:48 PM    Performed by: Renee Deluna DO  Authorized by: Renee Deluna DO    Patient location:  ED  Previous ECG:     Previous ECG:  Compared to current    Comparison ECG info:  9/17/23: Normal sinus rhythm, nonspecific T wave abnormality.     Similarity:  No change  Interpretation:     Interpretation: abnormal    Rate:     ECG rate:  69    ECG rate assessment: normal    Rhythm:     Rhythm: sinus rhythm    Ectopy:     Ectopy: none    QRS:     QRS axis:  Normal    QRS intervals:  Normal  Conduction:     Conduction: normal    ST segments:     ST segments:  Normal  T waves:     T waves: flattening and inverted      Flattening:  V1, aVL, V5 and V6  Q waves:     Q waves:  AVR          ED Course  ED Course as of 09/20/23 1651   Wed Sep 20, 2023   1257 Blood Pressure(!): 187/79   1257 Temperature: 98.1 °F (36.7 °C)   1257 Pulse: 78   1257 Respirations: 18   1257 SpO2: 96 %   1257 64 yo F no known past medical history, presenting to the emergency department for altered mental status per family. Patient states that she feels "foggy". Patient was seen and evaluated 4 days ago for similar symptoms. Patient was discharged home at that time. Patient's family states patient had a near syncopal episode while at a bagel shop. Patient family states patient is also been unsteady on her feet for last couple days. Patient states that her arms feel heavy, she feels fatigued. Patient's family notes that 20 years ago patient had a psychotic episode where she was hallucinating and they states this feels similar without the hallucinations. However patient has no memory of this. Patient denies fevers, chills, chest pain, shortness of breath, abdominal pain, headache, vision changes, focal weakness, neck pain, nausea, vomiting, dysuria, hematuria, diarrhea, constipation. Physical exam: Resting comfortably in bed, no acute distress. Heart was regular rate and rhythm, lungs were clear to auscultation bilaterally, abdomen was soft nontender. Horizontal nystagmus but not fatigable on the left, fatigable on the right. Otherwise cranial nerves II through XII are intact. Strength sensation intact to all 4 extremities. Normal finger-to-nose. Mild unsteady gait. Concern for: CVA versus ACS versus electrolyte disturbance versus dysrhythmia versus UTI versus psychiatric   1321 CBC and differential(!)  Slight leukocytosis 10.3. Normal hemoglobin. Normal platelets. Otherwise unremarkable. 1321 Comprehensive metabolic panel(!)  Slightly elevated glucose. Otherwise electrolytes within normal limits. Elevated alk phos. Otherwise unremarkable. 1321 hs TnI 0hr: <2  EKG did not show any acute ischemic changes. 1353 UA w Reflex to Microscopic w Reflex to Culture  Within normal limits. 1444 PREGNANCY TEST URINE: Negative   1534 CTA head and neck with and without contrast  IMPRESSION:     No acute intracranial pathology.  No significant stenosis of the cervical carotid or vertebral arteries. No significant intracranial stenosis, large vessel occlusion or aneurysm. 1534 hs TnI 2hr: <2  Unable to calculate   1534 Results were discussed with patient. She expressed understanding. Discussed plan: To admit to hospital for stroke work-up. Patient expressed understanding was agreeable with this plan. Patient was given the opportunity to question the emergency department. All question concerns were addressed the emergency department. 0 Spoke with the admitting team, who agreed with observational admission. HEART Risk Score    Flowsheet Row Most Recent Value   Heart Score Risk Calculator    History 1 Filed at: 09/20/2023 1649   ECG 1 Filed at: 09/20/2023 1649   Age 1 Filed at: 09/20/2023 1649   Risk Factors 1 Filed at: 09/20/2023 1649   Troponin 0 Filed at: 09/20/2023 1649   HEART Score 4 Filed at: 09/20/2023 [address] [place] [address]           Stroke Assessment     Row Name 09/20/23 1257             NIH Stroke Scale    Interval Baseline      Level of Consciousness (1a.) 0      LOC Questions (1b.) 0      LOC Commands (1c.) 0      Best Gaze (2.) 1      Visual (3.) 0      Facial Palsy (4.) 0      Motor Arm, Left (5a.) 0      Motor Arm, Right (5b.) 0      Motor Leg, Left (6a.) 0      Motor Leg, Right (6b.) 0      Limb Ataxia (7.) 0      Sensory (8.) 0      Best Language (9.) 0      Dysarthria (10.) 0      Extinction and Inattention (11.) (Formerly Neglect) 0      Total 1              Flowsheet Row Most Recent Value   Thrombolytic Decision Options    Thrombolytic Decision Patient not a candidate. Patient is not a candidate options Unclear time of onset outside appropriate time window. Medical Decision Making  See ED course for more details of medical decision making    Amount and/or Complexity of Data Reviewed  Labs: ordered. Decision-making details documented in ED Course. Radiology: ordered. Decision-making details documented in ED Course.       Risk  Prescription drug management. Decision regarding hospitalization. Disposition  Final diagnoses:   Nystagmus   Unsteady gait when walking   Near syncope   Elevated blood pressure reading     Time reflects when diagnosis was documented in both MDM as applicable and the Disposition within this note     Time User Action Codes Description Comment    9/20/2023  4:01 PM Prudy Anderson Add [H55.00] Nystagmus     9/20/2023  4:01 PM Prudy Anderson Add [R26.81] Unsteady gait when walking     9/20/2023  4:01 PM Prudy Anderson Add [R55] Near syncope     9/20/2023  4:02 PM Prudy Anderson Add [R03.0] Elevated blood pressure reading     9/20/2023  4:43 PM Tiki Thuan Add [R41.0] Disorientation       ED Disposition     ED Disposition   Admit    Condition   Stable    Date/Time   Wed Sep 20, 2023  4:01 PM    Comment   Case was discussed with NASRIN and the patient's admission status was agreed to be Admission Status: observation status to the service of Dr. Zion Rosen . Follow-up Information    None         Patient's Medications   Discharge Prescriptions    No medications on file     No discharge procedures on file. PDMP Review     None           ED Provider  Attending physically available and evaluated Mark Fuentes. I managed the patient along with the ED Attending.     Electronically Signed by         Candice Newberry DO  09/20/23 9644

## 2023-09-20 NOTE — ASSESSMENT & PLAN NOTE
Lab Results   Component Value Date    HGBA1C 6.3 (H) 08/07/2023       No results for input(s): "POCGLU" in the last 72 hours.     Blood Sugar Average: Last 72 hrs:  · CMP shows glucose at 197    Plan:  · Initiate SSI  · Monitor glucose  · Will adjust insulin regiment if necessary

## 2023-09-20 NOTE — ASSESSMENT & PLAN NOTE
Assessment:  · Patient had POA BP reading of 187/79  · Patient reports never having high BP  · A few weeks ago she had a regular check up and her BP was normal    Plan:  · Consider starting lisinopril 10 mg daily before discharge if stroke work-up unremarkable  · Continue to monitor BP

## 2023-09-21 ENCOUNTER — APPOINTMENT (OUTPATIENT)
Dept: NON INVASIVE DIAGNOSTICS | Facility: HOSPITAL | Age: 63
End: 2023-09-21
Payer: COMMERCIAL

## 2023-09-21 ENCOUNTER — APPOINTMENT (OUTPATIENT)
Dept: MRI IMAGING | Facility: HOSPITAL | Age: 63
End: 2023-09-21
Payer: COMMERCIAL

## 2023-09-21 VITALS
WEIGHT: 251 LBS | OXYGEN SATURATION: 95 % | RESPIRATION RATE: 20 BRPM | TEMPERATURE: 98 F | SYSTOLIC BLOOD PRESSURE: 148 MMHG | BODY MASS INDEX: 46.19 KG/M2 | HEART RATE: 74 BPM | HEIGHT: 62 IN | DIASTOLIC BLOOD PRESSURE: 82 MMHG

## 2023-09-21 LAB
ALBUMIN SERPL BCP-MCNC: 3.9 G/DL (ref 3.5–5)
ALP SERPL-CCNC: 101 U/L (ref 34–104)
ALT SERPL W P-5'-P-CCNC: 13 U/L (ref 7–52)
ANION GAP SERPL CALCULATED.3IONS-SCNC: 9 MMOL/L
AST SERPL W P-5'-P-CCNC: 16 U/L (ref 13–39)
BASOPHILS # BLD AUTO: 0.05 THOUSANDS/ÂΜL (ref 0–0.1)
BASOPHILS NFR BLD AUTO: 0 % (ref 0–1)
BILIRUB SERPL-MCNC: 0.37 MG/DL (ref 0.2–1)
BUN SERPL-MCNC: 8 MG/DL (ref 5–25)
CALCIUM SERPL-MCNC: 8.7 MG/DL (ref 8.4–10.2)
CHLORIDE SERPL-SCNC: 105 MMOL/L (ref 96–108)
CHOLEST SERPL-MCNC: 160 MG/DL
CO2 SERPL-SCNC: 25 MMOL/L (ref 21–32)
CREAT SERPL-MCNC: 0.73 MG/DL (ref 0.6–1.3)
EOSINOPHIL # BLD AUTO: 0.17 THOUSAND/ÂΜL (ref 0–0.61)
EOSINOPHIL NFR BLD AUTO: 2 % (ref 0–6)
ERYTHROCYTE [DISTWIDTH] IN BLOOD BY AUTOMATED COUNT: 15.9 % (ref 11.6–15.1)
EST. AVERAGE GLUCOSE BLD GHB EST-MCNC: 151 MG/DL
FOLATE SERPL-MCNC: >22.3 NG/ML
GFR SERPL CREATININE-BSD FRML MDRD: 89 ML/MIN/1.73SQ M
GLUCOSE P FAST SERPL-MCNC: 142 MG/DL (ref 65–99)
GLUCOSE SERPL-MCNC: 113 MG/DL (ref 65–140)
GLUCOSE SERPL-MCNC: 124 MG/DL (ref 65–140)
GLUCOSE SERPL-MCNC: 142 MG/DL (ref 65–140)
HBA1C MFR BLD: 6.9 %
HCT VFR BLD AUTO: 41.1 % (ref 34.8–46.1)
HDLC SERPL-MCNC: 46 MG/DL
HGB BLD-MCNC: 13 G/DL (ref 11.5–15.4)
IMM GRANULOCYTES # BLD AUTO: 0.07 THOUSAND/UL (ref 0–0.2)
IMM GRANULOCYTES NFR BLD AUTO: 1 % (ref 0–2)
LDLC SERPL CALC-MCNC: 76 MG/DL (ref 0–100)
LYMPHOCYTES # BLD AUTO: 2.22 THOUSANDS/ÂΜL (ref 0.6–4.47)
LYMPHOCYTES NFR BLD AUTO: 19 % (ref 14–44)
MCH RBC QN AUTO: 26.2 PG (ref 26.8–34.3)
MCHC RBC AUTO-ENTMCNC: 31.6 G/DL (ref 31.4–37.4)
MCV RBC AUTO: 83 FL (ref 82–98)
MONOCYTES # BLD AUTO: 0.91 THOUSAND/ÂΜL (ref 0.17–1.22)
MONOCYTES NFR BLD AUTO: 8 % (ref 4–12)
NEUTROPHILS # BLD AUTO: 8.01 THOUSANDS/ÂΜL (ref 1.85–7.62)
NEUTS SEG NFR BLD AUTO: 70 % (ref 43–75)
NRBC BLD AUTO-RTO: 0 /100 WBCS
PLATELET # BLD AUTO: 304 THOUSANDS/UL (ref 149–390)
PMV BLD AUTO: 8.8 FL (ref 8.9–12.7)
POTASSIUM SERPL-SCNC: 3.5 MMOL/L (ref 3.5–5.3)
PROT SERPL-MCNC: 7.5 G/DL (ref 6.4–8.4)
RBC # BLD AUTO: 4.97 MILLION/UL (ref 3.81–5.12)
SODIUM SERPL-SCNC: 139 MMOL/L (ref 135–147)
TRIGL SERPL-MCNC: 191 MG/DL
VIT B12 SERPL-MCNC: 1016 PG/ML (ref 180–914)
WBC # BLD AUTO: 11.43 THOUSAND/UL (ref 4.31–10.16)

## 2023-09-21 PROCEDURE — 70551 MRI BRAIN STEM W/O DYE: CPT

## 2023-09-21 PROCEDURE — 83036 HEMOGLOBIN GLYCOSYLATED A1C: CPT | Performed by: INTERNAL MEDICINE

## 2023-09-21 PROCEDURE — 80053 COMPREHEN METABOLIC PANEL: CPT

## 2023-09-21 PROCEDURE — 93306 TTE W/DOPPLER COMPLETE: CPT

## 2023-09-21 PROCEDURE — 82948 REAGENT STRIP/BLOOD GLUCOSE: CPT

## 2023-09-21 PROCEDURE — 80061 LIPID PANEL: CPT | Performed by: INTERNAL MEDICINE

## 2023-09-21 PROCEDURE — 99245 OFF/OP CONSLTJ NEW/EST HI 55: CPT | Performed by: PSYCHIATRY & NEUROLOGY

## 2023-09-21 PROCEDURE — 99239 HOSP IP/OBS DSCHRG MGMT >30: CPT | Performed by: INTERNAL MEDICINE

## 2023-09-21 PROCEDURE — 85025 COMPLETE CBC W/AUTO DIFF WBC: CPT

## 2023-09-21 RX ORDER — METOCLOPRAMIDE HYDROCHLORIDE 5 MG/ML
10 INJECTION INTRAMUSCULAR; INTRAVENOUS EVERY 6 HOURS SCHEDULED
Status: DISCONTINUED | OUTPATIENT
Start: 2023-09-21 | End: 2023-09-21 | Stop reason: HOSPADM

## 2023-09-21 RX ORDER — ACETAMINOPHEN 325 MG/1
975 TABLET ORAL EVERY 6 HOURS PRN
Status: DISCONTINUED | OUTPATIENT
Start: 2023-09-21 | End: 2023-09-21 | Stop reason: HOSPADM

## 2023-09-21 RX ORDER — DIPHENHYDRAMINE HYDROCHLORIDE 50 MG/ML
25 INJECTION INTRAMUSCULAR; INTRAVENOUS EVERY 6 HOURS
Status: DISCONTINUED | OUTPATIENT
Start: 2023-09-21 | End: 2023-09-21 | Stop reason: HOSPADM

## 2023-09-21 RX ORDER — ASPIRIN 81 MG/1
81 TABLET, CHEWABLE ORAL DAILY
Qty: 30 TABLET | Refills: 0 | Status: CANCELLED | OUTPATIENT
Start: 2023-09-22 | End: 2023-10-22

## 2023-09-21 RX ORDER — SODIUM CHLORIDE 9 MG/ML
100 INJECTION, SOLUTION INTRAVENOUS ONCE
Status: CANCELLED | OUTPATIENT
Start: 2023-09-21 | End: 2023-09-21

## 2023-09-21 RX ORDER — MAGNESIUM SULFATE HEPTAHYDRATE 40 MG/ML
2 INJECTION, SOLUTION INTRAVENOUS
Status: CANCELLED | OUTPATIENT
Start: 2023-09-21 | End: 2023-09-24

## 2023-09-21 RX ORDER — LISINOPRIL 5 MG/1
5 TABLET ORAL DAILY
Qty: 30 TABLET | Refills: 0 | Status: SHIPPED | OUTPATIENT
Start: 2023-09-21 | End: 2023-10-21

## 2023-09-21 RX ORDER — ATORVASTATIN CALCIUM 40 MG/1
40 TABLET, FILM COATED ORAL EVERY EVENING
Qty: 30 TABLET | Refills: 0 | Status: SHIPPED | OUTPATIENT
Start: 2023-09-21 | End: 2023-10-21

## 2023-09-21 RX ORDER — KETOROLAC TROMETHAMINE 30 MG/ML
15 INJECTION, SOLUTION INTRAMUSCULAR; INTRAVENOUS EVERY 6 HOURS SCHEDULED
Status: DISCONTINUED | OUTPATIENT
Start: 2023-09-21 | End: 2023-09-21 | Stop reason: HOSPADM

## 2023-09-21 RX ADMIN — ASPIRIN 81 MG 81 MG: 81 TABLET ORAL at 09:23

## 2023-09-21 RX ADMIN — ENOXAPARIN SODIUM 40 MG: 40 INJECTION SUBCUTANEOUS at 09:23

## 2023-09-21 RX ADMIN — ACETAMINOPHEN 975 MG: 325 TABLET, FILM COATED ORAL at 13:42

## 2023-09-21 NOTE — DISCHARGE SUMMARY
[facility]  Discharge- Kyle Reeves 1960, 63 y.o. female MRN: [id number]  Unit/Bed#: S -01 Encounter: [id number]  Primary Care Provider: Vannesa Carver MD   Date and time admitted to hospital: 9/20/2023 12:19 PM    * AMS (altered mental status)  Assessment & Plan  Assessment:  · Patient reports feeling foggy with unsteady gait  · CT head: Unremarkable  · Recent lipid panel in August 2023 showed elevated triglycerides at 186 and LDL cholesterol at 100  · CBC was monstly unremarkable with WBC minimally elevated at 10.33  · Troponins at 0, 2 hour negative  · UDS, pregnancy test negative  · UA with reflex unremarkable  · POA glucose of 197  · BP on admission was 187/79  · Foggy sensation could be related to elevated BP  · NSR on telemetry  · EKG showed NSR  · 9/21: Patient feeling better overall     Plan:  · Discharge today  · Continue statin 40 mg at night        Transient elevated blood pressure  Assessment & Plan  Assessment:  · Patient had POA BP reading of 187/79  · Patient reports never having high BP  · A few weeks ago she had a regular check up and her BP was normal  · On day of discharge BP was elevated with MAP of 118    Plan:  · Will start lisinopril 5 mg daily    Blood glucose elevated  Assessment & Plan  Lab Results   Component Value Date    HGBA1C 6.3 (H) 08/07/2023       Recent Labs     09/20/23  1853 09/20/23  2105 09/21/23  0816 09/21/23  1130   POCGLU 67 141* 124 113       Blood Sugar Average: Last 72 hrs:  · CMP shows glucose at 197  · Counseled patient on importance of losing weight    Plan:  · Follow up with PCP  · Discharge home today   · Improve diet      Medical Problems      Discharging Resident: Dharmesh Santos MD  Discharging Attending: Adriana Daigle MD  PCP: Vannesa Carver MD  Admission Date:   Admission Orders (From admission, onward)     Ordered        09/20/23 1603  Place in Observation  Once                      Discharge Date: 09/21/23    Consultations During Hospital Stay:  · Neurology    Procedures Performed:   · None    Significant Findings / Test Results:   · Elevated blood pressures    Incidental Findings:   · None     Test Results Pending at Discharge (will require follow up): · None     Outpatient Tests Requested:  · None    Complications: None    Reason for Admission: Fatigue and Fogginess    Hospital Course:   Lexy Barnett is a 63 y.o. female patient with past medical history significant for vertigo and hyperlipidemia who originally presented to the hospital on 9/20/2023 due to fatigue and brain fogginess. In the hospital she was worked up for stroke and placed on the stroke pathway. In the ED she was found to have elevated blood pressures at 187/79 which persisted throughout her hospital stay. Blood pressures were averaging MAPs around 115. CT head, MRI brain w/o contrast was unremarkable. Echo was completed and was unremarkable. X-ray chest was unremarkable. Rapid drug screen, pregnancy test, CMP, UA with reflex were all unremarkable. She was started on atorvastatin 40 mg. At discharge she was instructed to continue taking these medications as long as her low-dose lisinopril 5 mg daily until she follows up with her PCP to evaluate need to continue this medication. The patient, initially admitted to the hospital as inpatient, was discharged earlier than expected given the following: Stroke ruled out. Please see above list of diagnoses and related plan for additional information. Condition at Discharge: good    Discharge Day Visit / Exam:   Subjective: No acute events overnight. Patient seen resting comfortably in her bed. She felt that she was feeling better overall but that something was still a little off. She had no complaints of chest pain, shortness of breath, nausea, vomiting, chills, fevers.     Vitals: Blood Pressure: 163/96 (09/21/23 0817)  Pulse: 70 (09/21/23 0652)  Temperature: 98 °F (36.7 °C) (09/21/23 6487)  Temp Source: Oral (09/21/23 0400)  Respirations: 20 (09/21/23 0817)  SpO2: 95 % (09/21/23 0392)  Exam:   Physical Exam  Vitals and nursing note reviewed. Constitutional:       General: She is not in acute distress. Appearance: She is obese. She is not ill-appearing or toxic-appearing. HENT:      Mouth/Throat:      Mouth: Mucous membranes are moist.      Pharynx: Oropharynx is clear. Eyes:      Extraocular Movements: Extraocular movements intact. Conjunctiva/sclera: Conjunctivae normal.      Comments: Nystagmus present, more prominent in left eye    There is right beating nystagmus on leftward deviation of the eyes    Cardiovascular:      Rate and Rhythm: Normal rate and regular rhythm. Pulses: Normal pulses. Heart sounds: Normal heart sounds. No murmur heard. No gallop. Pulmonary:      Effort: Pulmonary effort is normal.      Breath sounds: Normal breath sounds. No wheezing, rhonchi or rales. Chest:      Chest wall: No tenderness. Abdominal:      Palpations: Abdomen is soft. Tenderness: There is no abdominal tenderness. There is no right CVA tenderness, left CVA tenderness, guarding or rebound. Musculoskeletal:      Right lower leg: No edema. Left lower leg: No edema. Skin:     General: Skin is warm. Capillary Refill: Capillary refill takes less than 2 seconds. Neurological:      Mental Status: She is alert and oriented to person, place, and time. Psychiatric:         Mood and Affect: Mood normal.         Behavior: Behavior normal.       Discussion with Family: Patient declined call to . Discharge instructions/Information to patient and family:   See after visit summary for information provided to patient and family. Provisions for Follow-Up Care:  See after visit summary for information related to follow-up care and any pertinent home health orders.        Disposition:   Home    Planned Readmission: No    Discharge Medications:  See after visit summary for reconciled discharge medications provided to patient and/or family.       **Please Note: This note may have been constructed using a voice recognition system**

## 2023-09-21 NOTE — PLAN OF CARE
Problem: PAIN - ADULT  Goal: Verbalizes/displays adequate comfort level or baseline comfort level  Description: Interventions:  - Encourage patient to monitor pain and request assistance  - Assess pain using appropriate pain scale  - Administer analgesics based on type and severity of pain and evaluate response  - Implement non-pharmacological measures as appropriate and evaluate response  - Consider cultural and social influences on pain and pain management  - Notify physician/advanced practitioner if interventions unsuccessful or patient reports new pain  Outcome: Completed     Problem: INFECTION - ADULT  Goal: Absence or prevention of progression during hospitalization  Description: INTERVENTIONS:  - Assess and monitor for signs and symptoms of infection  - Monitor lab/diagnostic results  - Monitor all insertion sites, i.e. indwelling lines, tubes, and drains  - Monitor endotracheal if appropriate and nasal secretions for changes in amount and color  - [place] appropriate cooling/warming therapies per order  - Administer medications as ordered  - Instruct and encourage patient and family to use good hand hygiene technique  - Identify and instruct in appropriate isolation precautions for identified infection/condition  Outcome: Completed  Goal: Absence of fever/infection during neutropenic period  Description: INTERVENTIONS:  - Monitor WBC    Outcome: Completed     Problem: SAFETY ADULT  Goal: Patient will remain free of falls  Description: INTERVENTIONS:  - Educate patient/family on patient safety including physical limitations  - Instruct patient to call for assistance with activity   - Consult OT/PT to assist with strengthening/mobility   - Keep Call bell within reach  - Keep bed low and locked with side rails adjusted as appropriate  - Keep care items and personal belongings within reach  - Initiate and maintain comfort rounds  - Make Fall Risk Sign visible to staff  - Offer Toileting every  Hours, in advance of need  - Initiate/Maintain alarm  - Obtain necessary fall risk management equipment:   - Apply yellow socks and bracelet for high fall risk patients  - Consider moving patient to room near nurses station  Outcome: Completed  Goal: Maintain or return to baseline ADL function  Description: INTERVENTIONS:  -  Assess patient's ability to carry out ADLs; assess patient's baseline for ADL function and identify physical deficits which impact ability to perform ADLs (bathing, care of mouth/teeth, toileting, grooming, dressing, etc.)  - Assess/evaluate cause of self-care deficits   - Assess range of motion  - Assess patient's mobility; develop plan if impaired  - Assess patient's need for assistive devices and provide as appropriate  - Encourage maximum independence but intervene and supervise when necessary  - Involve family in performance of ADLs  - Assess for home care needs following discharge   - Consider OT consult to assist with ADL evaluation and planning for discharge  - Provide patient education as appropriate  Outcome: Completed  Goal: Maintains/Returns to pre admission functional level  Description: INTERVENTIONS:  - Perform BMAT or MOVE assessment daily.   - Set and communicate daily mobility goal to care team and patient/family/caregiver. - Collaborate with rehabilitation services on mobility goals if consulted  - Perform Range of Motion  times a day. - Reposition patient every  hours.   - Dangle patient  times a day  - Stand patient  times a day  - Ambulate patient  times a day  - Out of bed to chair  times a day   - Out of bed for meals  times a day  - Out of bed for toileting  - Record patient progress and toleration of activity level   Outcome: Completed     Problem: DISCHARGE PLANNING  Goal: Discharge to home or other facility with appropriate resources  Description: INTERVENTIONS:  - Identify barriers to discharge w/patient and caregiver  - Arrange for needed discharge resources and transportation as appropriate  - Identify discharge learning needs (meds, wound care, etc.)  - Arrange for interpretive services to assist at discharge as needed  - Refer to Case Management Department for coordinating discharge planning if the patient needs post-hospital services based on physician/advanced practitioner order or complex needs related to functional status, cognitive ability, or social support system  Outcome: Completed     Problem: Knowledge Deficit  Goal: Patient/family/caregiver demonstrates understanding of disease process, treatment plan, medications, and discharge instructions  Description: Complete learning assessment and assess knowledge base.   Interventions:  - Provide teaching at level of understanding  - Provide teaching via preferred learning methods  Outcome: Completed

## 2023-09-21 NOTE — ASSESSMENT & PLAN NOTE
Lab Results   Component Value Date    HGBA1C 6.3 (H) 08/07/2023       Recent Labs     09/20/23  1853 09/20/23  2105 09/21/23  0816 09/21/23  1130   POCGLU 67 141* 124 113       Blood Sugar Average: Last 72 hrs:  · CMP shows glucose at 197    Plan:  · Initiate SSI  · Monitor glucose  · Will adjust insulin regiment if necessary

## 2023-09-21 NOTE — PLAN OF CARE
Problem: PAIN - ADULT  Goal: Verbalizes/displays adequate comfort level or baseline comfort level  Description: Interventions:  - Encourage patient to monitor pain and request assistance  - Assess pain using appropriate pain scale  - Administer analgesics based on type and severity of pain and evaluate response  - Implement non-pharmacological measures as appropriate and evaluate response  - Consider cultural and social influences on pain and pain management  - Notify physician/advanced practitioner if interventions unsuccessful or patient reports new pain  Outcome: Progressing     Problem: INFECTION - ADULT  Goal: Absence or prevention of progression during hospitalization  Description: INTERVENTIONS:  - Assess and monitor for signs and symptoms of infection  - Monitor lab/diagnostic results  - Monitor all insertion sites, i.e. indwelling lines, tubes, and drains  - Monitor endotracheal if appropriate and nasal secretions for changes in amount and color  - [place] appropriate cooling/warming therapies per order  - Administer medications as ordered  - Instruct and encourage patient and family to use good hand hygiene technique  - Identify and instruct in appropriate isolation precautions for identified infection/condition  Outcome: Progressing  Goal: Absence of fever/infection during neutropenic period  Description: INTERVENTIONS:  - Monitor WBC    Outcome: Progressing     Problem: SAFETY ADULT  Goal: Patient will remain free of falls  Description: INTERVENTIONS:  - Educate patient/family on patient safety including physical limitations  - Instruct patient to call for assistance with activity   - Consult OT/PT to assist with strengthening/mobility   - Keep Call bell within reach  - Keep bed low and locked with side rails adjusted as appropriate  - Keep care items and personal belongings within reach  - Initiate and maintain comfort rounds  - Make Fall Risk Sign visible to staff  - Offer Toileting every 2 Hours, in advance of need  - Apply yellow socks and bracelet for high fall risk patients  - Consider moving patient to room near nurses station  Outcome: Progressing  Goal: Maintain or return to baseline ADL function  Description: INTERVENTIONS:  -  Assess patient's ability to carry out ADLs; assess patient's baseline for ADL function and identify physical deficits which impact ability to perform ADLs (bathing, care of mouth/teeth, toileting, grooming, dressing, etc.)  - Assess/evaluate cause of self-care deficits   - Assess range of motion  - Assess patient's mobility; develop plan if impaired  - Assess patient's need for assistive devices and provide as appropriate  - Encourage maximum independence but intervene and supervise when necessary  - Involve family in performance of ADLs  - Assess for home care needs following discharge   - Consider OT consult to assist with ADL evaluation and planning for discharge  - Provide patient education as appropriate  Outcome: Progressing  Goal: Maintains/Returns to pre admission functional level  Description: INTERVENTIONS:  - Perform BMAT or MOVE assessment daily.   - Set and communicate daily mobility goal to care team and patient/family/caregiver. - Collaborate with rehabilitation services on mobility goals if consulted  - Perform Range of Motion 3 times a day. - Reposition patient every 2 hours.   - Dangle patient 3 times a day  - Stand patient 3 times a day  - Ambulate patient 3 times a day  - Out of bed to chair 3 times a day   - Out of bed for meals 3 times a day  - Out of bed for toileting  - Record patient progress and toleration of activity level   Outcome: Progressing     Problem: DISCHARGE PLANNING  Goal: Discharge to home or other facility with appropriate resources  Description: INTERVENTIONS:  - Identify barriers to discharge w/patient and caregiver  - Arrange for needed discharge resources and transportation as appropriate  - Identify discharge learning needs (meds, wound care, etc.)  - Arrange for interpretive services to assist at discharge as needed  - Refer to Case Management Department for coordinating discharge planning if the patient needs post-hospital services based on physician/advanced practitioner order or complex needs related to functional status, cognitive ability, or social support system  Outcome: Progressing     Problem: Knowledge Deficit  Goal: Patient/family/caregiver demonstrates understanding of disease process, treatment plan, medications, and discharge instructions  Description: Complete learning assessment and assess knowledge base.   Interventions:  - Provide teaching at level of understanding  - Provide teaching via preferred learning methods  Outcome: Progressing

## 2023-09-21 NOTE — UTILIZATION REVIEW
Initial Clinical Review    Admission: Date/Time/Statement:   Admission Orders (From admission, onward)     Ordered        09/20/23 1603  Place in Observation  Once                      Orders Placed This Encounter   Procedures   • Place in Observation     Standing Status:   Standing     Number of Occurrences:   1     Order Specific Question:   Level of Care     Answer:   Med Surg [16]     ED Arrival Information     Expected   -    Arrival   9/20/2023 11:58    Acuity   Urgent            Means of arrival   Wheelchair    Escorted by   Family Member    Service   Hospitalist    Admission type   Emergency            Arrival complaint   AMS           Chief Complaint   Patient presents with   • Altered Mental Status     Pt to ED with c/o feeling foggy about an hour ago and feeling weak. Seen on Sunday for similar episode       Initial Presentation: 63 y.o. female with PMH significant for vertigo for which she received vestibular therapy who presents with altered mental status. On Sunday at 11 am she was going to play corn vzaar but her arms felt heavy and she had brain fog, did not have vision changes but didn't feel like herself. Friend reported she looked pale. Her friends then helped her to the ground. She came to [address] [place] [address] and the work-up was negative. Today she reports she was going to get a bagel with her daughter but her daughter said her eyes were shaking and that she was not focusing and extremely fatigued. Plan: Observation for altered mental status, transient elevated blood pressure, elevated blood glucose: stroke pathway, Neurology consult, MRI brain, permissive HTN, start statin, ASA, telemetry, Echo, consider starting lisinopril, initiate SSI.      ED Triage Vitals   Temperature Pulse Respirations Blood Pressure SpO2   09/20/23 1216 09/20/23 1216 09/20/23 1216 09/20/23 1216 09/20/23 1216   98.1 °F (36.7 °C) 78 18 (!) 187/79 96 %      Temp Source Heart Rate Source Patient Position - Orthostatic VS BP Location FiO2 (%)   09/20/23 1900 09/20/23 1330 09/20/23 1330 09/20/23 1330 --   Oral Monitor Lying Right arm       Pain Score       09/20/23 1623       No Pain          Wt Readings from Last 1 Encounters:   09/17/23 114 kg (251 lb 15.8 oz)     Additional Vital Signs:     Date/Time Temp Pulse Resp BP MAP (mmHg) SpO2 O2 Device   09/21/23 06:52:36 98 °F (36.7 °C) 70 -- 169/89 116 95 % --   09/21/23 0400 98.3 °F (36.8 °C) 75 16 180/90 Abnormal  -- 96 % None (Room air)   09/21/23 02:19:23 98.6 °F (37 °C) 82 18 148/95 113 94 % --   09/21/23 00:03:29 98.5 °F (36.9 °C) 76 20 156/88 111 93 % --   09/20/23 2300 99.1 °F (37.3 °C) 77 20 161/107 Abnormal  -- 96 % None (Room air)   09/20/23 2200 98 °F (36.7 °C) 66 -- 166/80 -- 94 % None (Room air)   09/20/23 2100 98.1 °F (36.7 °C) 74 -- 168/102 Abnormal  124 93 % None (Room air)   09/20/23 2000 97.9 °F (36.6 °C) 74 -- 161/89 113 96 % None (Room air)   09/20/23 1900 98 °F (36.7 °C) 67 -- 153/100 118 95 % None (Room air)   09/20/23 1623 -- 74 18 164/79 113 97 % None (Room air)   09/20/23 1430 -- 72 -- -- -- 96 % --   09/20/23 1415 -- 76 -- -- -- 96 % --   09/20/23 1400 -- 76 -- -- -- 96 % --   09/20/23 1345 -- 73 -- -- -- 96 % --   09/20/23 1330 -- 67 18 184/84 Abnormal  120 96 % None (Room air)   09/20/23 1315 -- 69 -- -- -- -- --   09/20/23 1300 -- 67 -- -- -- -- --     Pertinent Labs/Diagnostic Test Results:   CTA head and neck with and without contrast   Final Result by ZARA Acosta MD (09/20 1504)      No acute intracranial pathology. No significant stenosis of the cervical carotid or vertebral arteries. No significant intracranial stenosis, large vessel occlusion or aneurysm.                   Workstation performed: [id number]         MRI brain wo contrast    (Results Pending)     Results from last 7 days   Lab Units 09/17/23  1325   SARS-COV-2  Negative     Results from last 7 days   Lab Units 09/21/23  0523 09/20/23  1227 09/17/23  1300   WBC Thousand/uL 11.43* 10.33* 10.14   HEMOGLOBIN g/dL 13.0 13.1 13.2   HEMATOCRIT % 41.1 42.6 42.4   PLATELETS Thousands/uL 304 314 313   NEUTROS ABS Thousands/µL 8.01* 7.23 7.43         Results from last 7 days   Lab Units 09/21/23  0523 09/20/23  1227 09/17/23  1300   SODIUM mmol/L 139 138 138   POTASSIUM mmol/L 3.5 4.0 3.8   CHLORIDE mmol/L 105 102 102   CO2 mmol/L 25 26 26   ANION GAP mmol/L 9 10 10   BUN mg/dL 8 8 10   CREATININE mg/dL 0.73 0.80 0.80   EGFR ml/min/1.73sq m 89 80 80   CALCIUM mg/dL 8.7 9.0 9.3     Results from last 7 days   Lab Units 09/21/23  0523 09/20/23  1227 09/17/23  1300   AST U/L 16 26 25   ALT U/L 13 16 19   ALK PHOS U/L 101 112* 111*   TOTAL PROTEIN g/dL 7.5 8.0 8.2   ALBUMIN g/dL 3.9 4.0 4.2   TOTAL BILIRUBIN mg/dL 0.37 0.45 0.50     Results from last 7 days   Lab Units 09/20/23  2105 09/20/23  1853   POC GLUCOSE mg/dl 141* 67     Results from last 7 days   Lab Units 09/21/23  0523 09/20/23  1227 09/17/23  1300   GLUCOSE RANDOM mg/dL 142* 197* 151*           Results from last 7 days   Lab Units 09/20/23  1444 09/20/23  1227 09/17/23  1300   HS TNI 0HR ng/L  --  <2 <2   HS TNI 2HR ng/L <2  --   --            Results from last 7 days   Lab Units 09/20/23  1330 09/17/23  1325   CLARITY UA  Clear Clear   COLOR UA  Colorless Colorless   SPEC GRAV UA  1.004 1.005   PH UA  5.5 5.5   GLUCOSE UA mg/dl Negative Negative   KETONES UA mg/dl Negative Negative   BLOOD UA  Negative Negative   PROTEIN UA mg/dl Negative Negative   NITRITE UA  Negative Negative   BILIRUBIN UA  Negative Negative   UROBILINOGEN UA (BE) mg/dl <2.0 <2.0   LEUKOCYTES UA  Negative Negative     Results from last 7 days   Lab Units 09/17/23  1325   INFLUENZA A PCR  Negative   INFLUENZA B PCR  Negative   RSV PCR  Negative         Results from last 7 days   Lab Units 09/20/23  1330   AMPH/METH  Negative   BARBITURATE UR  Negative   BENZODIAZEPINE UR  Negative   COCAINE UR  Negative   METHADONE URINE  Negative   OPIATE UR  Negative   PCP UR  Negative THC UR  Negative         ED Treatment:   Medication Administration from 09/20/2023 1158 to 09/20/2023 1831       Date/Time Order Dose Route Action     09/20/2023 1404 EDT iohexol (OMNIPAQUE) 350 MG/ML injection (MULTI-DOSE) 85 mL 85 mL Intravenous Given        History reviewed. No pertinent past medical history. Present on Admission:  • Blood glucose elevated      Admitting Diagnosis: Elevated blood pressure reading [R03.0]  Nystagmus [H55.00]  Disorientation [R41.0]  Blood glucose elevated [R73.9]  Near syncope [R55]  Transient elevated blood pressure [R03.0]  AMS (altered mental status) [R41.82]  Unsteady gait when walking [R26.81]  Age/Sex: 63 y.o. female  Admission Orders:  Scheduled Medications:  aspirin, 81 mg, Oral, Daily  atorvastatin, 40 mg, Oral, QPM  enoxaparin, 40 mg, Subcutaneous, Q12H [address]  insulin lispro, 1-6 Units, Subcutaneous, TID AC      Continuous IV Infusions:     PRN Meds:       IP CONSULT TO CASE MANAGEMENT  IP CONSULT TO NUTRITION SERVICES  IP CONSULT TO NEUROLOGY    Network Utilization Review Department  ATTENTION: Please call with any questions or concerns to [phone] and carefully listen to the prompts so that you are directed to the right person. All voicemails are confidential.  Sola Ramirez all requests for admission clinical reviews, approved or denied determinations and any other requests to dedicated fax number below belonging to the campus where the patient is receiving treatment.  List of dedicated fax numbers for the Facilities:  [place] DENIALS (Administrative/Medical Necessity) [phone] [address] (Maternity/NICU/Pediatrics) [phone]   [address] 1000 [facility] [phone] [address] [phone]   [address] [place] [address]  [phone]

## 2023-09-21 NOTE — CONSULTS
[address] [facility]  Consult  Name: Ryanne No 63 y.o. female I MRN: [id number]  Unit/Bed#: S -01 I Date of Admission: 9/20/2023   Date of Service: 9/21/2023 I Hospital Day: 0      Neurology Consult Note      Assessment plan:    * AMS (altered mental status)  Assessment & Plan  · Patient came to the emergency room via EMS on Sunday 9/17 for generalized weakness, brain fog, near syncopal event  · Patient was treated in the ED and discharged feeling improved but not quite back to baseline  · From Sunday to Wednesday 9/20 patient continued to experience brain fog and difficulty with balance. · On Wednesday patient and daughter went to breakfast. Per patient's daughter, while out for breakfast, the patient became confused with associated slurred speech, horizontal nystagmus, and another near syncopal event . · Patient returned to ED for further evaluation and treatment   · Today on examination 9/21: patient reported bilateral frontal headache with sensitivity to light as well as continued brain fog and disequilibrium. Patient's Romberg test was abnormal bilaterally and she had slight horizontal nystagmus when looking to the lower left. AAOx3. Speech was normal.    Relevant workup:  MRI brain - unremarkable, no acute intracranial pathology  CT head/neck - unremarkable no acute intracranial pathology  EKG -  Normal sinus rhythm  Troponins - Negative  Echo - pending  Lipid panel - elevated triglycerides at 191,  low HDL at 46  B12 - within normal limits  WBC -slightly elevated at 11.43  UA - negative         Impression: Considering negative MRI brain and CT head/neck it is unlikely that the patient had a stroke. Patient's hypetensive state with systolic's as high as 856L may be causing hypertensive encephalopathy leading to the brain fog, disequilibrium, and headaches.  Better control of blood pressure should help to reduce and relieve symptoms      Plan:   · Start migraine cocktail including:  · Toradol 15 mg every 6 hours (for total of 2 doses)  · Reglan 10 mg every 6 hours (for total of 2 doses)  · Benadryl 25 mg every 6 hours (for total of 2 doses)  · Migraine cocktail can be restarted tomorrow if she still has headache   · Normotensive goal    Recommendations for outpatient neurological follow up have yet to be determined. Pending for discharge: Neurology will follow-up tomorrow to see if patient's symptoms have improved. Subjective:    Reason for Consult / Principal Problem: Altered mental status, nystagmus, steady gait  Hx and PE limited by: Patient's  and daughter believe that patient is minimizing some of the recent events. Therefore the following HPI is a combination of patient's report and family's report. HPI: Delbert Moncada is a 63 y.o. ambidextrous  female with a past medical history of vertigo s/p vestibular therapy who presents with altered mental status, near syncopal episode, episodes of abnormal eye moments, and unsteady gait. Patient first began to experience the symptoms on Sunday 9/17 when she was attempting to play corn hole outside. All of a sudden she began to feel weak with associated brain fog. Her bilateral upper extremities began to feel heavy and she bent over a chair to catch her balance. Patient then fell to the ground however she did not hit her head nor did she lose consciousness. EMS was called and as patient was attempting to walk to the ambulance she fell to her knees again stating that it felt as if her legs gave out from under her. Again she did not lose consciousness nor did she hit her head. Patient was treated in the ED on Sunday and was discharged home feeling improved, but not totally back to her baseline. Patient continued to experience unsteady gait and brain fog throughout the beginning of this week. On Wednesday 9/20 the patient went to breakfast with her daughter.   When eating breakfast, patient's daughter noticed that she became confused and started to slightly slur her speech. Then the patient's eyes began to rapidly shake horizontally back and forth. Patient went to stand to leave the restaurant and she had another near syncopal episode. Patient did not want the EMS called so her daughter drove her to the hospital.     Patient has never experienced these symptoms before. However approximately 20 years ago patient hit her head on a nail that was wedged in a wooden beam.  This trauma to her head led to a weeklong admission in the hospital and transient amnesia in which she forgot both who her daughter and  were. Her memory later returned and she has been without neurologic symptoms since then. Patient has no history of seizures, migraines, strokes in the past.  Additionally patient's has no family history of brain cancer, stroke, seizure, migraines. Patient's parents both had significant cardiac medical histories. He only medications the patient currently takes at home is 10 mg daily of atorvastatin. Patient denied alcohol use, recreational drug use, tobacco use. On exam today patient expressed concern about her blood pressure stating that she has never been treated for hypertension in the past.  Patient reports that she slept poorly last night only getting an hour or 2 of sleep. Today she states that she has a bilateral frontal headache that is worsened with light. Per her , the patient's brain fog continues to be present. Patient still feels unsteady on her feet. Inpatient consult to Neurology  Consult performed by: Grecia Cuevas DO  Consult ordered by: Srini Thompson MD          Historical Information   History reviewed. No pertinent past medical history.   Past Surgical History:   Procedure Laterality Date   • BREAST BIOPSY       Social History   Social History     Substance and Sexual Activity   Alcohol Use Not Currently     Social History     Substance and Sexual Activity   Drug Use Never     E-Cigarette/Vaping   • E-Cigarette Use Never User      E-Cigarette/Vaping Substances     Social History     Tobacco Use   Smoking Status Never   Smokeless Tobacco Never     Family History: History reviewed. No pertinent family history. Meds/Allergies   all current active meds have been reviewed and current meds:   Current Facility-Administered Medications   Medication Dose Route Frequency   • acetaminophen (TYLENOL) tablet 975 mg  975 mg Oral Q6H PRN   • aspirin chewable tablet 81 mg  81 mg Oral Daily   • atorvastatin (LIPITOR) tablet 40 mg  40 mg Oral QPM   • diphenhydrAMINE (BENADRYL) injection 25 mg  25 mg Intravenous Q6H   • enoxaparin (LOVENOX) subcutaneous injection 40 mg  40 mg Subcutaneous Q12H [facility]   • insulin lispro (HumaLOG) 100 units/mL subcutaneous injection 1-6 Units  1-6 Units Subcutaneous TID AC   • ketorolac (TORADOL) injection 15 mg  15 mg Intravenous Q6H [facility]   • metoclopramide (REGLAN) injection 10 mg  10 mg Intravenous Q6H [facility]     No Known Allergies        Review of Systems   HENT: Negative for tinnitus. Eyes: Positive for photophobia. Negative for pain and visual disturbance. Neurological: Positive for headaches. Negative for dizziness, speech difficulty, weakness, light-headedness and numbness. Bilateral frontal headache; mental "fogginess", lack of balance        Objective:     Patient ID: Catina Sullivan is a 63 y.o. female. Vitals:   Vitals:    23 0400 23 0652 23 0817 23 1302   BP: (!) 180/90 169/89 163/96 148/82   BP Location: Right arm   Right arm   Pulse: 75 70     Resp: 16  20    Temp: 98.3 °F (36.8 °C) 98 °F (36.7 °C)     TempSrc: Oral      SpO2: 96% 95%        There is no height or weight on file to calculate BMI.      Intake/Output Summary (Last 24 hours) at 2023 1537  Last data filed at 2023 1901  Gross per 24 hour   Intake 0 ml   Output 0 ml   Net 0 ml       Temperature:   Temp (24hrs), Av.3 °F (36.8 °C), Min:97.9 °F (36.6 °C), Max:99.1 °F (37.3 °C)    Temperature: 98 °F (36.7 °C)    Invasive Devices: Invasive Devices     Peripheral Intravenous Line  Duration           Peripheral IV 09/20/23 Left Antecubital 1 day                Physical Exam  Constitutional:       General: She is not in acute distress. Appearance: Normal appearance. She is not ill-appearing or toxic-appearing. HENT:      Head:      Comments: Slight indent on top of head that is tender when palpated (this is chronic per patient report)  Eyes:      Extraocular Movements: EOM normal.      Pupils: Pupils are equal, round, and reactive to light. Neurological:      Mental Status: She is alert and oriented to person, place, and time. Motor: Motor strength is normal.     Coordination: Romberg Test abnormal. Finger-Nose-Finger Test and Heel to [facility] Test normal.      Deep Tendon Reflexes:      Reflex Scores:       Brachioradialis reflexes are 2+ on the right side and 2+ on the left side. Patellar reflexes are 2+ on the right side and 2+ on the left side. Achilles reflexes are 2+ on the right side and 2+ on the left side. Psychiatric:         Speech: Speech normal.          Neurologic Exam     Mental Status   Oriented to person, place, and time. Attention: normal. Concentration: normal.   Speech: speech is normal   Level of consciousness: alert  Knowledge: good. Able to name object. Able to read. Able to repeat. Normal comprehension. Memory: Patient was able to recall the story events of 9/11     Cranial Nerves     CN II   Visual fields full to confrontation. Right visual field deficit: none  Left visual field deficit: none     CN III, IV, VI   Pupils are equal, round, and reactive to light. Extraocular motions are normal.   Nystagmus: bilateral     CN V   Right facial sensation deficit: none  Left facial sensation deficit: none    CN VII   Facial expression full, symmetric.      CN XII   CN XII normal.   EOMI with bilateral end gaze nystagmus when looking to the lower left      Motor Exam   Right arm pronator drift: absent  Left arm pronator drift: absent    Strength   Strength 5/5 throughout. Sensory Exam   Right arm light touch: normal  Left arm light touch: normal  Right leg light touch: normal  Left leg light touch: normal    Gait, Coordination, and Reflexes     Coordination   Romberg: positive  Finger to nose coordination: normal  Heel to shin coordination: normal    Reflexes   Right brachioradialis: 2+  Left brachioradialis: 2+  Right patellar: 2+  Left patellar: 2+  Right achilles: 2+  Left achilles: 2+  Right plantar: normal  Left plantar: normal  Right Rockwell: absent  Left Rockwell: absent  Right ankle clonus: absent  Left ankle clonus: absentSlow gait but able to walk without assistance            Labs: I have personally reviewed pertinent reports. Results from last 7 days   Lab Units 09/21/23  0523 09/20/23  1227 09/17/23  1300   WBC Thousand/uL 11.43* 10.33* 10.14   HEMOGLOBIN g/dL 13.0 13.1 13.2   HEMATOCRIT % 41.1 42.6 42.4   PLATELETS Thousands/uL 304 314 313   NEUTROS PCT % 70 69 73   MONOS PCT % 8 5 5   EOS PCT % 2 3 1      Results from last 7 days   Lab Units 09/21/23  0523 09/20/23  1227 09/17/23  1300   POTASSIUM mmol/L 3.5 4.0 3.8   CHLORIDE mmol/L 105 102 102   CO2 mmol/L 25 26 26   BUN mg/dL 8 8 10   CREATININE mg/dL 0.73 0.80 0.80   CALCIUM mg/dL 8.7 9.0 9.3   ALK PHOS U/L 101 112* 111*   ALT U/L 13 16 19   AST U/L 16 26 25                            Imaging and diagnostic studies:    Radiology Results: I have personally reviewed pertinent reports. MRI brain wo contrast   Final Result by Richard Lantigua MD (09/21 1300)         No acute infarction, edema, or mass effect. Workstation performed: [id number]         CTA head and neck with and without contrast   Final Result by ZARA Sesay MD (09/20 0434)      No acute intracranial pathology.  No significant stenosis of the cervical carotid or vertebral arteries. No significant intracranial stenosis, large vessel occlusion or aneurysm. Workstation performed: [id number]             Other Diagnostic Testing: I have personally reviewed pertinent reports. Active medications:  Current Facility-Administered Medications   Medication Dose Route Frequency   • acetaminophen (TYLENOL) tablet 975 mg  975 mg Oral Q6H PRN   • aspirin chewable tablet 81 mg  81 mg Oral Daily   • atorvastatin (LIPITOR) tablet 40 mg  40 mg Oral QPM   • diphenhydrAMINE (BENADRYL) injection 25 mg  25 mg Intravenous Q6H   • enoxaparin (LOVENOX) subcutaneous injection 40 mg  40 mg Subcutaneous Q12H [address]   • insulin lispro (HumaLOG) 100 units/mL subcutaneous injection 1-6 Units  1-6 Units Subcutaneous TID AC   • ketorolac (TORADOL) injection 15 mg  15 mg Intravenous Q6H [address]   • metoclopramide (REGLAN) injection 10 mg  10 mg Intravenous Q6H [address]       Prior to Admission medications    Medication Sig Start Date End Date Taking?  Authorizing Provider   atorvastatin (LIPITOR) 10 mg tablet Take 10 mg by mouth daily   Yes Historical Provider, MD         Code status and advanced directives:  Code Status: Level 1 - Full Code      VTE Pharmacologic Prophylaxis: Enoxaparin (Lovenox)  VTE Mechanical Prophylaxis: per SLIM management     ==  Gudelia Corona DO  PGY-1

## 2023-09-21 NOTE — ASSESSMENT & PLAN NOTE
· Patient came to the emergency room via EMS on Sunday 9/17 for generalized weakness, brain fog, near syncopal event  · Patient was treated in the ED and discharged feeling improved but not quite back to baseline  · From Sunday to Wednesday 9/20 patient continued to experience brain fog and difficulty with balance. · On Wednesday patient and daughter went to breakfast. Per patient's daughter, while out for breakfast, the patient became confused with associated slurred speech, horizontal nystagmus, and another near syncopal event . · Patient returned to ED for further evaluation and treatment   · Today on examination 9/21: patient reported bilateral frontal headache with sensitivity to light as well as continued brain fog and disequilibrium. Patient's Romberg test was abnormal bilaterally and she had slight horizontal nystagmus when looking to the lower left. AAOx3. Speech was normal.    Relevant workup:  MRI brain - unremarkable, no acute intracranial pathology  CT head/neck - unremarkable no acute intracranial pathology  EKG -  Normal sinus rhythm  Troponins - Negative  Echo - pending  Lipid panel - elevated triglycerides at 191,  low HDL at 46  B12 - within normal limits  WBC -slightly elevated at 11.43  UA - negative         Impression: Considering negative MRI brain and CT head/neck it is unlikely that the patient had a stroke. Patient's hypetensive state with systolic's as high as 309N may be causing hypertensive encephalopathy leading to the brain fog, disequilibrium, and headaches.  Better control of blood pressure should help to reduce and relieve symptoms      Plan:   · Start migraine cocktail including:  · Toradol 15 mg every 6 hours (for total of 2 doses)  · Reglan 10 mg every 6 hours (for total of 2 doses)  · Benadryl 25 mg every 6 hours (for total of 2 doses)  · Migraine cocktail can be restarted tomorrow if she still has headache   · Normotensive goal

## 2023-09-21 NOTE — DISCHARGE INSTR - AVS FIRST PAGE
Dear Sharath Romo,     It was our pleasure to care for you here at [facility]. It is our hope that we were always able to exceed the expected standards for your care during your stay. You were hospitalized due to fatigue and brain fog. You were cared for on the third floor by Erika Barker MD under the service of Tonia Monroe MD with the [facility] who covers for your primary care physician (PCP), Krystina Levin MD, while you were hospitalized. If you have any questions or concerns related to this hospitalization, you may contact us at [phone]. For follow up as well as any medication refills, we recommend that you follow up with your primary care physician. A registered nurse will reach out to you by phone within a few days after your discharge to answer any additional questions that you may have after going home. However, at this time we provide for you here, the most important instructions / recommendations at discharge:     Notable Medication Adjustments -   Start taking atorvastatin 40 mg daily  Start taking lisinopril 5 mg daily  Testing Required after Discharge -   None  Important follow up information -   None  Please follow up with your primary care physician after discharge  Other Instructions -   Should your symptoms return, or you develop new concerning symptoms, please call your doctor and/or go to your nearest Emergency Department. Please review this entire after visit summary as additional general instructions including medication list, appointments, activity, diet, any pertinent wound care, and other additional recommendations from your care team that may be provided for you.       Sincerely,     Erika Barker MD

## 2023-09-21 NOTE — SPEECH THERAPY NOTE
Speech Language/Pathology  Speech/Language Pathology  Assessment    Patient Name: Troy Antonio  Today's Date: 9/21/2023     Problem List  Active Problems:    Blood glucose elevated    AMS (altered mental status)    Transient elevated blood pressure    Past Medical History  History reviewed. No pertinent past medical history. Past Surgical History  Past Surgical History:   Procedure Laterality Date   • BREAST BIOPSY       Troy Antonio is a 63 y.o. female with PMH significant for vertigo for which she received vestibular therapy who presents with altered mental status. On Sunday at 11 am she was going to play corn hole but her arms felt heavy and she had brain fog, did not have vision changes but didn't feel like herself. Friend reported she looked pale. Her friends then helped her to the ground. She came to [address] [place] [address] and the work-up was negative. Today she reports she was going to get a bagel with her daughter but her daughter said her eyes were shaking and that she was not focusing and extremely fatigued. She does not report feeling nauseous, lightheaded, headaches, or syncope. Currently she feels "woozy" and that something is off. Does not report any new stresses    Consult received for speech/swallow eval on stroke pathway. Pt passed nsg swallow screen; tolerating regular diet w/o s/s dysphagia or aspiration. No speech/language deficits reported. NIH score is 0  MRI: pending     No need for formal speech/swallow eval at this time. Reconsult if needed.     April Rose, [address] [facility] CCC-SLP  Speech Pathologist  Available via  tiger text

## 2023-09-21 NOTE — OCCUPATIONAL THERAPY NOTE
Occupational Therapy Screen    Patient Name: Rivera Scott  Today's Date: 9/21/2023 09/21/23 0823   OT Last Visit   OT Visit Date 09/21/23   Note Type   Note type Screen   Additional Comments OT orders received and chart review performed. Pt admitted w/ AMS. Spoke to [employer] who reports pt is a self in the room. Spoke to pt who was oriented and reports no concerns about safe DC to home. Will screen from OT caseload. Please reconsult if functional status changes.      ORESTES Garcia, OTR/L  [address] [place] [address] Road License [id number]  [address] [place], [place] [id number]

## 2023-09-21 NOTE — CASE MANAGEMENT
Case Management Progress Note    Patient name Sharath Romo  Location S [address] [facility] 324/S -01 MRN [id number]  : 1960 Date 2023       LOS (days): 0  Geometric Mean LOS (GMLOS) (days):   Days to GMLOS:        OBJECTIVE:        Current admission status: Observation  Preferred Pharmacy:   [facility] [id number]  [place] [address]  Phone: [phone] Fax: [phone]    Primary Care Provider: Krystina Levin MD    Primary Insurance: [facility]  Secondary Insurance:     PROGRESS NOTE:    CM consult received for ischemia stroke - per SLIM rounds, patient to d/c today once MRI results in - no needs identified at this time. Attempt then made to meet with patient to complete assessment as d/c orders entered, but patient had already left the unit. Unable to complete full assessment prior to hospital d/c.

## 2023-09-22 LAB
AORTIC ROOT: 3 CM
APICAL FOUR CHAMBER EJECTION FRACTION: 57 %
ASCENDING AORTA: 3 CM
E WAVE DECELERATION TIME: 206 MS
FRACTIONAL SHORTENING: 36 (ref 28–44)
INTERVENTRICULAR SEPTUM IN DIASTOLE (PARASTERNAL SHORT AXIS VIEW): 1.1 CM
INTERVENTRICULAR SEPTUM: 1.1 CM (ref 0.6–1.1)
LAAS-AP4: 13.8 CM2
LEFT ATRIUM SIZE: 3.4 CM
LEFT INTERNAL DIMENSION IN SYSTOLE: 2.8 CM (ref 2.1–4)
LEFT VENTRICULAR INTERNAL DIMENSION IN DIASTOLE: 4.4 CM (ref 3.5–6)
LEFT VENTRICULAR POSTERIOR WALL IN END DIASTOLE: 1.2 CM
LEFT VENTRICULAR STROKE VOLUME: 59 ML
LVSV (TEICH): 59 ML
MV E'TISSUE VEL-LAT: 9 CM/S
MV E'TISSUE VEL-SEP: 11 CM/S
MV PEAK A VEL: 0.86 M/S
MV PEAK E VEL: 86 CM/S
MV STENOSIS PRESSURE HALF TIME: 60 MS
MV VALVE AREA P 1/2 METHOD: 3.67
RIGHT ATRIUM AREA SYSTOLE A4C: 8.8 CM2
RIGHT VENTRICLE ID DIMENSION: 2.3 CM
SL CV LV EF: 60
SL CV PED ECHO LEFT VENTRICLE DIASTOLIC VOLUME (MOD BIPLANE) 2D: 89 ML
SL CV PED ECHO LEFT VENTRICLE SYSTOLIC VOLUME (MOD BIPLANE) 2D: 30 ML
TRICUSPID VALVE PEAK REGURGITATION VELOCITY: 2.12 M/S

## 2023-09-22 PROCEDURE — 93306 TTE W/DOPPLER COMPLETE: CPT | Performed by: INTERNAL MEDICINE

## 2023-11-01 ENCOUNTER — TELEPHONE (OUTPATIENT)
Dept: NEUROLOGY | Facility: CLINIC | Age: 63
End: 2023-11-01

## 2023-11-01 NOTE — TELEPHONE ENCOUNTER
Hello,     Can you please advise which Speciality/Team and if patient can be seen by an Resident, AP and or Attending  only? Thank you for your time,     Moisés Ponce          Recommendations for outpatient neurological follow up have yet to be determined. Pending for discharge: Neurology will follow-up tomorrow to see if patient's symptoms have improved.           HFU/ OLGA DOMINIQUE/ GENARO    ME- HOME -

## 2023-11-09 NOTE — TELEPHONE ENCOUNTER
2nd request.    Jaqueline Hammonds,     Can you please advise which Speciality/Team and if patient can be seen by an Resident, AP and or Attending  only?     Thank you for your time,     Jessie Obrien

## 2023-11-09 NOTE — TELEPHONE ENCOUNTER
Called patient and spoke with her regarding HFU. Patient declined to schedule. I did advise her that she can schedule an HFU up to 1 yr from her d/c date, anything after that would be a new patient appt. She verbalized understanding. Not scheduling HFU at this time     Patient stated is doing fine.     Thank you,     Fabiano Casillas

## 2024-02-22 ENCOUNTER — HOSPITAL ENCOUNTER (EMERGENCY)
Facility: HOSPITAL | Age: 64
Discharge: HOME | End: 2024-02-22
Attending: EMERGENCY MEDICINE
Payer: COMMERCIAL

## 2024-02-22 ENCOUNTER — APPOINTMENT (EMERGENCY)
Dept: RADIOLOGY | Facility: HOSPITAL | Age: 64
End: 2024-02-22
Payer: COMMERCIAL

## 2024-02-22 VITALS
OXYGEN SATURATION: 96 % | RESPIRATION RATE: 18 BRPM | HEART RATE: 74 BPM | DIASTOLIC BLOOD PRESSURE: 88 MMHG | TEMPERATURE: 97.9 F | SYSTOLIC BLOOD PRESSURE: 141 MMHG

## 2024-02-22 DIAGNOSIS — M77.11 LATERAL EPICONDYLITIS OF RIGHT ELBOW: Primary | ICD-10-CM

## 2024-02-22 PROCEDURE — 73080 X-RAY EXAM OF ELBOW: CPT

## 2024-02-22 PROCEDURE — 99284 EMERGENCY DEPT VISIT MOD MDM: CPT | Performed by: EMERGENCY MEDICINE

## 2024-02-22 PROCEDURE — 99283 EMERGENCY DEPT VISIT LOW MDM: CPT

## 2024-02-22 RX ORDER — NAPROXEN 500 MG/1
500 TABLET ORAL 2 TIMES DAILY PRN
Qty: 20 TABLET | Refills: 0 | Status: SHIPPED | OUTPATIENT
Start: 2024-02-22

## 2024-02-22 NOTE — ED PROVIDER NOTES
History  Chief Complaint   Patient presents with    Arm Pain     Pt presenting with right elbow pain that radiates to her shoulder after playing corn hole yesterday. Denies any fall or injury. Ibuprofen taken about an hour ago with no relief         63-year-old female presents with right elbow pain.,  Patient is in a corn hole league/tournament, says she played for multiple hours yesterday, did not have any pain during the actual plane but after felt some discomfort to her right elbow, when she was going to sleep and worsened upon waking up this morning it was severe where she has pain particularly with supination pronation or any palpation along the lateral epicondyle/radial head.,  Describes pain as dull constant, nonradiating, no distal numbness or weakness.  No previous history of this.  No overlying skin changes.      Arm Pain  Associated symptoms: no chest pain, no fever, no headaches, no rash and no shortness of breath        Prior to Admission Medications   Prescriptions Last Dose Informant Patient Reported? Taking?   atorvastatin (LIPITOR) 40 mg tablet   No No   Sig: Take 1 tablet (40 mg total) by mouth every evening   lisinopril (ZESTRIL) 5 mg tablet   No No   Sig: Take 1 tablet (5 mg total) by mouth daily      Facility-Administered Medications: None       History reviewed. No pertinent past medical history.    Past Surgical History:   Procedure Laterality Date    BREAST BIOPSY         History reviewed. No pertinent family history.  I have reviewed and agree with the history as documented.    E-Cigarette/Vaping    E-Cigarette Use Never User      E-Cigarette/Vaping Substances     Social History     Tobacco Use    Smoking status: Never    Smokeless tobacco: Never   Vaping Use    Vaping status: Never Used   Substance Use Topics    Alcohol use: Not Currently    Drug use: Never       Review of Systems   Constitutional:  Negative for fever.   Respiratory:  Negative for chest tightness and shortness of breath.     Cardiovascular:  Negative for chest pain.   Skin:  Negative for rash.   Neurological:  Negative for dizziness, light-headedness and headaches.       Physical Exam  Physical Exam  Vitals reviewed.   Constitutional:       General: She is not in acute distress.     Appearance: She is well-developed.   HENT:      Head: Atraumatic.      Nose: Nose normal.   Eyes:      General: No scleral icterus.        Right eye: No discharge.         Left eye: No discharge.      Conjunctiva/sclera: Conjunctivae normal.   Neck:      Trachea: No tracheal deviation.   Pulmonary:      Effort: Pulmonary effort is normal. No respiratory distress.      Breath sounds: No stridor.   Musculoskeletal:         General: Tenderness present. No deformity.      Cervical back: Normal range of motion.      Comments: Tenderness over radial head and lateral epicondyle.  Of the right arm.,  No pain with palpation of the bicep or tricep muscle no pain with palpation of the forearm.  No pain in the antecubital fossa no overlying skin changes significant discomfort with passive or active supination pronation   Skin:     General: Skin is warm and dry.      Coloration: Skin is not pale.      Findings: No erythema or rash.   Neurological:      Mental Status: She is alert.      Motor: No abnormal muscle tone.      Coordination: Coordination normal.         Vital Signs  ED Triage Vitals [02/22/24 1323]   Temperature Pulse Respirations Blood Pressure SpO2   97.9 °F (36.6 °C) 74 18 141/88 96 %      Temp Source Heart Rate Source Patient Position - Orthostatic VS BP Location FiO2 (%)   Oral Monitor Sitting Right arm --      Pain Score       --           Vitals:    02/22/24 1323   BP: 141/88   Pulse: 74   Patient Position - Orthostatic VS: Sitting         Visual Acuity      ED Medications  Medications - No data to display    Diagnostic Studies  Results Reviewed       None                   XR elbow 3+ views RIGHT   ED Interpretation by Quinn Mallory DO (02/22  1451)   No acute fracture                 Procedures  Procedures         ED Course                                             Medical Decision Making        Initial ED assessment:   63-year-old female, repetitive motion injury playing corn hole yesterday now with severe pain over the lateral epicondyle    Initial DDx includes but is not limited to:   Lateral epicondylitis, calcific tendinitis    Initial ED plan:   X-ray to evaluate for microcalcifications consistent with calcific tendinitis otherwise we will treat with NSAIDs        Final ED summary/disposition:   After evaluation and workup in the emergency department, discharged with Voltaren gel and naproxen orally, patient to follow-up sports medicine if symptoms do not improved    Amount and/or Complexity of Data Reviewed  Radiology: ordered and independent interpretation performed.    Risk  Prescription drug management.             Disposition  Final diagnoses:   Lateral epicondylitis of right elbow     Time reflects when diagnosis was documented in both MDM as applicable and the Disposition within this note       Time User Action Codes Description Comment    2/22/2024  2:54 PM Quinn Mallory Add [M77.11] Lateral epicondylitis of right elbow     2/22/2024  2:55 PM Quinn Mallory Modify [M77.11] Lateral epicondylitis of right elbow           ED Disposition       ED Disposition   Discharge    Condition   Stable    Date/Time   Thu Feb 22, 2024  2:54 PM    Comment   Candice Iglesias discharge to home/self care.                   Follow-up Information       Follow up With Specialties Details Why Contact Info Additional Information    [facility] [place] [facility] Go to  If symptoms worsen [address] [facility]  [address]  [facility] [address] [phone] [facility] [address], [place], [place], [address]            Discharge Medication List as of 2/22/2024  2:55 PM         START taking these medications    Details   Diclofenac Sodium (VOLTAREN) 1 % Apply 2 g topically 4 (four) times a day, Starting Thu 2/22/2024, Print      naproxen (NAPROSYN) 500 mg tablet Take 1 tablet (500 mg total) by mouth 2 (two) times a day as needed for mild pain, Starting Thu 2/22/2024, Print           CONTINUE these medications which have NOT CHANGED    Details   atorvastatin (LIPITOR) 40 mg tablet Take 1 tablet (40 mg total) by mouth every evening, Starting Thu 9/21/2023, Until Sat 10/21/2023, Normal      lisinopril (ZESTRIL) 5 mg tablet Take 1 tablet (5 mg total) by mouth daily, Starting Thu 9/21/2023, Until Sat 10/21/2023, Normal             No discharge procedures on file.    PDMP Review       None            ED Provider  Electronically Signed by             Quinn Mallory DO  02/22/24 7086

## 2024-05-12 ENCOUNTER — HOSPITAL ENCOUNTER (EMERGENCY)
Facility: HOSPITAL | Age: 64
Discharge: HOME | End: 2024-05-12
Attending: EMERGENCY MEDICINE | Admitting: EMERGENCY MEDICINE
Payer: COMMERCIAL

## 2024-05-12 ENCOUNTER — APPOINTMENT (EMERGENCY)
Dept: RADIOLOGY | Facility: HOSPITAL | Age: 64
End: 2024-05-12
Payer: COMMERCIAL

## 2024-05-12 VITALS
RESPIRATION RATE: 18 BRPM | HEART RATE: 58 BPM | SYSTOLIC BLOOD PRESSURE: 154 MMHG | BODY MASS INDEX: 47.38 KG/M2 | WEIGHT: 259.04 LBS | TEMPERATURE: 98.3 F | OXYGEN SATURATION: 100 % | DIASTOLIC BLOOD PRESSURE: 70 MMHG

## 2024-05-12 DIAGNOSIS — R07.9 CHEST PAIN WITH LOW RISK FOR CARDIAC ETIOLOGY: Primary | ICD-10-CM

## 2024-05-12 LAB
ALBUMIN SERPL BCP-MCNC: 4.3 G/DL (ref 3.5–5)
ALP SERPL-CCNC: 117 U/L (ref 34–104)
ALT SERPL W P-5'-P-CCNC: 16 U/L (ref 7–52)
ANION GAP SERPL CALCULATED.3IONS-SCNC: 9 MMOL/L (ref 4–13)
APTT PPP: 31 SECONDS (ref 23–37)
AST SERPL W P-5'-P-CCNC: 21 U/L (ref 13–39)
ATRIAL RATE: 59 BPM
ATRIAL RATE: 69 BPM
BASOPHILS # BLD AUTO: 0.04 THOUSANDS/ÂΜL (ref 0–0.1)
BASOPHILS NFR BLD AUTO: 0 % (ref 0–1)
BILIRUB SERPL-MCNC: 0.43 MG/DL (ref 0.2–1)
BUN SERPL-MCNC: 10 MG/DL (ref 5–25)
CALCIUM SERPL-MCNC: 9.1 MG/DL (ref 8.4–10.2)
CARDIAC TROPONIN I PNL SERPL HS: <2 NG/L
CARDIAC TROPONIN I PNL SERPL HS: <2 NG/L
CHLORIDE SERPL-SCNC: 104 MMOL/L (ref 96–108)
CO2 SERPL-SCNC: 25 MMOL/L (ref 21–32)
CREAT SERPL-MCNC: 0.67 MG/DL (ref 0.6–1.3)
EOSINOPHIL # BLD AUTO: 0.19 THOUSAND/ÂΜL (ref 0–0.61)
EOSINOPHIL NFR BLD AUTO: 2 % (ref 0–6)
ERYTHROCYTE [DISTWIDTH] IN BLOOD BY AUTOMATED COUNT: 15.1 % (ref 11.6–15.1)
GFR SERPL CREATININE-BSD FRML MDRD: 95 ML/MIN/1.73SQ M
GLUCOSE SERPL-MCNC: 111 MG/DL (ref 65–140)
HCT VFR BLD AUTO: 44.2 % (ref 34.8–46.1)
HGB BLD-MCNC: 14.1 G/DL (ref 11.5–15.4)
IMM GRANULOCYTES # BLD AUTO: 0.07 THOUSAND/UL (ref 0–0.2)
IMM GRANULOCYTES NFR BLD AUTO: 1 % (ref 0–2)
INR PPP: 0.94 (ref 0.84–1.19)
LYMPHOCYTES # BLD AUTO: 2.07 THOUSANDS/ÂΜL (ref 0.6–4.47)
LYMPHOCYTES NFR BLD AUTO: 19 % (ref 14–44)
MCH RBC QN AUTO: 26.1 PG (ref 26.8–34.3)
MCHC RBC AUTO-ENTMCNC: 31.9 G/DL (ref 31.4–37.4)
MCV RBC AUTO: 82 FL (ref 82–98)
MONOCYTES # BLD AUTO: 0.86 THOUSAND/ÂΜL (ref 0.17–1.22)
MONOCYTES NFR BLD AUTO: 8 % (ref 4–12)
NEUTROPHILS # BLD AUTO: 7.51 THOUSANDS/ÂΜL (ref 1.85–7.62)
NEUTS SEG NFR BLD AUTO: 70 % (ref 43–75)
NRBC BLD AUTO-RTO: 0 /100 WBCS
P AXIS: 64 DEGREES
P AXIS: 70 DEGREES
PLATELET # BLD AUTO: 303 THOUSANDS/UL (ref 149–390)
PMV BLD AUTO: 8.6 FL (ref 8.9–12.7)
POTASSIUM SERPL-SCNC: 4.1 MMOL/L (ref 3.5–5.3)
PR INTERVAL: 138 MS
PR INTERVAL: 148 MS
PROT SERPL-MCNC: 8.1 G/DL (ref 6.4–8.4)
PROTHROMBIN TIME: 12.8 SECONDS (ref 11.6–14.5)
QRS AXIS: 57 DEGREES
QRS AXIS: 63 DEGREES
QRSD INTERVAL: 74 MS
QRSD INTERVAL: 82 MS
QT INTERVAL: 418 MS
QT INTERVAL: 446 MS
QTC INTERVAL: 441 MS
QTC INTERVAL: 447 MS
RBC # BLD AUTO: 5.4 MILLION/UL (ref 3.81–5.12)
SODIUM SERPL-SCNC: 138 MMOL/L (ref 135–147)
T WAVE AXIS: 38 DEGREES
T WAVE AXIS: 53 DEGREES
VENTRICULAR RATE: 59 BPM
VENTRICULAR RATE: 69 BPM
WBC # BLD AUTO: 10.74 THOUSAND/UL (ref 4.31–10.16)

## 2024-05-12 PROCEDURE — 99285 EMERGENCY DEPT VISIT HI MDM: CPT

## 2024-05-12 PROCEDURE — 36415 COLL VENOUS BLD VENIPUNCTURE: CPT | Performed by: EMERGENCY MEDICINE

## 2024-05-12 PROCEDURE — 71045 X-RAY EXAM CHEST 1 VIEW: CPT

## 2024-05-12 PROCEDURE — 80053 COMPREHEN METABOLIC PANEL: CPT | Performed by: EMERGENCY MEDICINE

## 2024-05-12 PROCEDURE — 85730 THROMBOPLASTIN TIME PARTIAL: CPT | Performed by: EMERGENCY MEDICINE

## 2024-05-12 PROCEDURE — 85610 PROTHROMBIN TIME: CPT | Performed by: EMERGENCY MEDICINE

## 2024-05-12 PROCEDURE — 84484 ASSAY OF TROPONIN QUANT: CPT | Performed by: EMERGENCY MEDICINE

## 2024-05-12 PROCEDURE — 93005 ELECTROCARDIOGRAM TRACING: CPT

## 2024-05-12 PROCEDURE — 85025 COMPLETE CBC W/AUTO DIFF WBC: CPT | Performed by: EMERGENCY MEDICINE

## 2024-05-12 PROCEDURE — 93010 ELECTROCARDIOGRAM REPORT: CPT

## 2024-05-12 PROCEDURE — 99285 EMERGENCY DEPT VISIT HI MDM: CPT | Performed by: EMERGENCY MEDICINE

## 2024-05-12 RX ORDER — LORAZEPAM 0.5 MG/1
0.5 TABLET ORAL ONCE
Status: COMPLETED | OUTPATIENT
Start: 2024-05-12 | End: 2024-05-12

## 2024-05-12 RX ADMIN — LORAZEPAM 0.5 MG: 0.5 TABLET ORAL at 13:49

## 2024-05-12 NOTE — ED PROVIDER NOTES
History  Chief Complaint   Patient presents with    Chest Pain     Pt reports driving w her  and they pulled over bc of her b/l chest tightness & b/l arm tremors. Denies the chest pain at this time. Full body tremors noted      63-year-old female with no past medical history presents to the emergency department complaining of chest tightness.  She states this occurred while driving to a store and lasted a few minutes.  She presents to the ED with tremors of her right arm and mouth and I was called back to evaluate the patient.  Once in the room, the patient is now completely asymptomatic.  She states when she had the chest pressure was across her entire chest without radiation to the arms or neck.  No shortness of breath, diaphoresis dizziness or vomiting.  While questioning the patient, she started with tremors to her mouth.  During this time the patient was able to speak without difficulty.  The tremors of her mouth stopped spontaneously after about a minute.  No prior history of similar episodes.  Patient denies recent illness.      History provided by:  Patient   used: No    Chest Pain  Chest pain location: Entire precordium.  Pain quality: tightness    Pain radiates to:  Does not radiate  Pain radiates to the back: no    Onset quality:  Sudden  Duration:  5 minutes  Timing:  Intermittent  Progression:  Resolved  Chronicity:  New  Context: at rest    Relieved by:  None tried  Worsened by:  Nothing tried  Ineffective treatments:  None tried  Associated symptoms: no abdominal pain, no altered mental status, no anxiety, no back pain, no claudication, no cough, no diaphoresis, no dizziness, no fatigue, no fever, no headache, no heartburn, no lower extremity edema, no nausea, no near-syncope, no numbness, no orthopnea, no palpitations, no PND, no shortness of breath, no syncope, not vomiting and no weakness    Risk factors: obesity    Risk factors: no coronary artery disease, no diabetes  mellitus, no hypertension, no prior DVT/PE, no smoking and no surgery        Prior to Admission Medications   Prescriptions Last Dose Informant Patient Reported? Taking?   Diclofenac Sodium (VOLTAREN) 1 % Not Taking  No No   Sig: Apply 2 g topically 4 (four) times a day   Patient not taking: Reported on 5/12/2024   Flaxseed, Linseed, (FLAX SEED OIL PO)   Yes Yes   Sig: Take by mouth in the morning   Multiple Vitamins-Minerals (Centrum Silver 50+Women) TABS   Yes Yes   Sig: Take 1 tablet by mouth in the morning   atorvastatin (LIPITOR) 40 mg tablet   No Yes   Sig: Take 1 tablet (40 mg total) by mouth every evening   lisinopril (ZESTRIL) 5 mg tablet   No No   Sig: Take 1 tablet (5 mg total) by mouth daily   naproxen (NAPROSYN) 500 mg tablet Not Taking  No No   Sig: Take 1 tablet (500 mg total) by mouth 2 (two) times a day as needed for mild pain   Patient not taking: Reported on 5/12/2024      Facility-Administered Medications: None       History reviewed. No pertinent past medical history.    Past Surgical History:   Procedure Laterality Date    BREAST BIOPSY         History reviewed. No pertinent family history.  I have reviewed and agree with the history as documented.    E-Cigarette/Vaping    E-Cigarette Use Never User      E-Cigarette/Vaping Substances     Social History     Tobacco Use    Smoking status: Never    Smokeless tobacco: Never   Vaping Use    Vaping status: Never Used   Substance Use Topics    Alcohol use: Not Currently    Drug use: Never       Review of Systems   Constitutional: Negative.  Negative for chills, diaphoresis, fatigue and fever.   HENT: Negative.  Negative for congestion, rhinorrhea and sore throat.    Eyes: Negative.  Negative for discharge, redness and itching.   Respiratory: Negative.  Negative for apnea, cough, chest tightness, shortness of breath and wheezing.    Cardiovascular:  Positive for chest pain. Negative for palpitations, orthopnea, claudication, leg swelling, syncope, PND  and near-syncope.   Gastrointestinal: Negative.  Negative for abdominal pain, heartburn, nausea and vomiting.   Endocrine: Negative.    Genitourinary: Negative.  Negative for flank pain, frequency and urgency.   Musculoskeletal: Negative.  Negative for back pain.   Skin: Negative.    Allergic/Immunologic: Negative.    Neurological:  Positive for tremors. Negative for dizziness, seizures, syncope, facial asymmetry, speech difficulty, weakness, light-headedness, numbness and headaches.   Hematological: Negative.    All other systems reviewed and are negative.      Physical Exam  Physical Exam  Vitals and nursing note reviewed.   Constitutional:       General: She is awake. She is not in acute distress.     Appearance: Normal appearance. She is well-developed and overweight. She is not ill-appearing, toxic-appearing or diaphoretic.   HENT:      Head: Normocephalic and atraumatic.      Right Ear: External ear normal.      Left Ear: External ear normal.      Nose: Nose normal.      Mouth/Throat:      Mouth: Mucous membranes are moist.   Eyes:      General: No scleral icterus.        Right eye: No discharge.         Left eye: No discharge.      Conjunctiva/sclera: Conjunctivae normal.   Neck:      Thyroid: No thyromegaly.      Vascular: No JVD.      Trachea: No tracheal deviation.   Cardiovascular:      Rate and Rhythm: Normal rate and regular rhythm.      Heart sounds: Normal heart sounds. No murmur heard.     No friction rub. No gallop.   Pulmonary:      Effort: Pulmonary effort is normal. No respiratory distress.      Breath sounds: Normal breath sounds. No stridor. No wheezing, rhonchi or rales.   Chest:      Chest wall: No tenderness.   Abdominal:      General: Bowel sounds are normal. There is no distension.      Palpations: Abdomen is soft. There is no mass.      Tenderness: There is no abdominal tenderness.      Hernia: No hernia is present.   Musculoskeletal:         General: No tenderness or deformity. Normal  range of motion.      Right lower leg: No edema.      Left lower leg: No edema.   Skin:     General: Skin is warm and dry.      Coloration: Skin is not jaundiced or pale.      Findings: No bruising, erythema, lesion or rash.   Neurological:      General: No focal deficit present.      Mental Status: She is alert and oriented to person, place, and time.      Motor: No weakness or abnormal muscle tone.      Deep Tendon Reflexes: Reflexes are normal and symmetric.   Psychiatric:         Behavior: Behavior is cooperative.      Comments: Seems anxious.  Tremors of her mouth but no speech deficits         Vital Signs  ED Triage Vitals   Temperature Pulse Respirations Blood Pressure SpO2   05/12/24 1209 05/12/24 1209 05/12/24 1209 05/12/24 1211 05/12/24 1209   98.3 °F (36.8 °C) 80 19 144/63 98 %      Temp Source Heart Rate Source Patient Position - Orthostatic VS BP Location FiO2 (%)   05/12/24 1209 05/12/24 1209 05/12/24 1209 05/12/24 1209 --   Oral Monitor Sitting Right arm       Pain Score       05/12/24 1530       No Pain           Vitals:    05/12/24 1209 05/12/24 1211 05/12/24 1352 05/12/24 1530   BP:  144/63 155/73 154/70   Pulse: 80  67 58   Patient Position - Orthostatic VS: Sitting Lying Sitting Sitting         Visual Acuity      ED Medications  Medications   LORazepam (ATIVAN) tablet 0.5 mg (0.5 mg Oral Given 5/12/24 1349)       Diagnostic Studies  Results Reviewed       Procedure Component Value Units Date/Time    HS Troponin I 2hr [[id number]] Collected: 05/12/24 1432    Lab Status: Final result Specimen: Blood from Arm, Left Updated: 05/12/24 1556     hs TnI 2hr <2 ng/L      Delta 2hr hsTnI --    HS Troponin I 4hr [[id number]]     Lab Status: No result Specimen: Blood     HS Troponin 0hr (reflex protocol) [[id number]]  (Normal) Collected: 05/12/24 1236    Lab Status: Final result Specimen: Blood from Arm, Left Updated: 05/12/24 1313     hs TnI 0hr <2 ng/L     Comprehensive metabolic panel [[id number]]  (Abnormal)  Collected: 05/12/24 1236    Lab Status: Final result Specimen: Blood from Arm, Left Updated: 05/12/24 1306     Sodium 138 mmol/L      Potassium 4.1 mmol/L      Chloride 104 mmol/L      CO2 25 mmol/L      ANION GAP 9 mmol/L      BUN 10 mg/dL      Creatinine 0.67 mg/dL      Glucose 111 mg/dL      Calcium 9.1 mg/dL      AST 21 U/L      ALT 16 U/L      Alkaline Phosphatase 117 U/L      Total Protein 8.1 g/dL      Albumin 4.3 g/dL      Total Bilirubin 0.43 mg/dL      eGFR 95 ml/min/1.73sq m     Narrative:      National Kid[employer] Disease Foundation guidelines for Chronic Kidney Disease (CKD):     Stage 1 with normal or high GFR (GFR > 90 mL/min/1.73 square meters)    Stage 2 Mild CKD (GFR = 60-89 mL/min/1.73 square meters)    Stage 3A Moderate CKD (GFR = 45-59 mL/min/1.73 square meters)    Stage 3B Moderate CKD (GFR = 30-44 mL/min/1.73 square meters)    Stage 4 Severe CKD (GFR = 15-29 mL/min/1.73 square meters)    Stage 5 End Stage CKD (GFR <15 mL/min/1.73 square meters)  Note: GFR calculation is accurate only with a steady state creatinine    Protime-INR [[id number]]  (Normal) Collected: 05/12/24 1236    Lab Status: Final result Specimen: Blood from Arm, Left Updated: 05/12/24 1305     Protime 12.8 seconds      INR 0.94    APTT [[id number]]  (Normal) Collected: 05/12/24 1236    Lab Status: Final result Specimen: Blood from Arm, Left Updated: 05/12/24 1305     PTT 31 seconds     CBC and differential [[id number]]  (Abnormal) Collected: 05/12/24 1236    Lab Status: Final result Specimen: Blood from Arm, Left Updated: 05/12/24 1251     WBC 10.74 Thousand/uL      RBC 5.40 Million/uL      Hemoglobin 14.1 g/dL      Hematocrit 44.2 %      MCV 82 fL      MCH 26.1 pg      MCHC 31.9 g/dL      RDW 15.1 %      MPV 8.6 fL      Platelets 303 Thousands/uL      nRBC 0 /100 WBCs      Segmented % 70 %      Immature Grans % 1 %      Lymphocytes % 19 %      Monocytes % 8 %      Eosinophils Relative 2 %      Basophils Relative 0 %      Absolute  Neutrophils 7.51 Thousands/µL      Absolute Immature Grans 0.07 Thousand/uL      Absolute Lymphocytes 2.07 Thousands/µL      Absolute Monocytes 0.86 Thousand/µL      Eosinophils Absolute 0.19 Thousand/µL      Basophils Absolute 0.04 Thousands/µL                    XR chest 1 view portable   ED Interpretation by Charleen Downey DO (05/12 1342)   nad                 Procedures  ECG 12 Lead Documentation Only    Date/Time: 5/12/2024 2:34 PM    Performed by: Charleen Downey DO  Authorized by: Charleen Downey DO    Indications / Diagnosis:  Cp  ECG reviewed by me, the ED Provider: yes    Patient location:  ED  Interpretation:     Interpretation: normal    Rate:     ECG rate:  68    ECG rate assessment: normal    Rhythm:     Rhythm: sinus rhythm    Ectopy:     Ectopy: none    Conduction:     Conduction: normal    ST segments:     ST segments:  Normal  T waves:     T waves: normal             ED Course  ED Course as of 05/12/24 1600   Sun May 12, 2024   1254 WBC(!): 10.74   1254 Hemoglobin: 14.1   1341 hs TnI 0hr: <2   1341 Sodium: 138   1341 Potassium: 4.1   1341 ALK PHOS(!): 117   1342 Chest x-ray: nad   1527 Patient feeling better.  Updated regarding results and awaiting second troponin   1559 hs TnI 2hr: <2             HEART Risk Score      Flowsheet Row Most Recent Value   Heart Score Risk Calculator    History 1 Filed at: 05/12/2024 1431   ECG 0 Filed at: 05/12/2024 1431   Age 1 Filed at: 05/12/2024 1431   Risk Factors 1 Filed at: 05/12/2024 1431   Troponin 0 Filed at: 05/12/2024 1431   HEART Score 3 Filed at: 05/12/2024 1431                          SBIRT 22yo+      Flowsheet Row Most Recent Value   Initial Alcohol Screen: US AUDIT-C     1. How often do you have a drink containing alcohol? 0 Filed at: 05/12/2024 1202   2. How many drinks containing alcohol do you have on a typical day you are drinking?  0 Filed at: 05/12/2024 1202   3b. FEMALE Any Age, or MALE 65+: How often do you have 4 or more  drinks on one occassion? 0 Filed at: 05/12/2024 1202   Audit-C Score 0 Filed at: 05/12/2024 1202   JI: How many times in the past year have you...    Used an illegal drug or used a prescription medication for non-medical reasons? Never Filed at: 05/12/2024 1202                      Medical Decision Making  63-year-old female presents to the ED with chest tightness that started while driving to the store.  She states she then started with tremors to her arms and mouth.  She arrived to the ED and I was called to the room secondary to these tremors, however when I arrived they have subsided.  She also states her chest pain is gone.  While questioning her she did start with shaking of her mouth again but no speech deficits or facial droop noted.  The tremors of her mouth then stopped.  I suspect these are secondary to anxiety and not related to actual neurologic cause.  Her physical exam is otherwise normal.  Will do cardiac workup and give Ativan for anxiety    Amount and/or Complexity of Data Reviewed  Labs: ordered. Decision-making details documented in ED Course.  Radiology: ordered and independent interpretation performed.  ECG/medicine tests: ordered and independent interpretation performed.     Details: Normal sinus rhythm rate 68.  No ectopy.  Normal conduction.  Normal ST-T waves.  No ischemia.  No STEMI    Risk  Prescription drug management.             Disposition  Final diagnoses:   Chest pain with low risk for cardiac etiology     Time reflects when diagnosis was documented in both MDM as applicable and the Disposition within this note       Time User Action Codes Description Comment    5/12/2024  4:00 PM Charleen Downey Add [R07.9] Chest pain with low risk for cardiac etiology           ED Disposition       ED Disposition   Discharge    Condition   Good    Date/Time   Sun May 12, 2024 0741    Comment   Candice Iglesias discharge to home/self care.                   Follow-up Information       Follow up  With Specialties Details Why Contact Info    Jacy Thomson MD Internal Medicine Schedule an appointment as soon as possible for a visit in 2 days If symptoms worsen or return to the emergency department [address] [facility]  [address]  [place] [id number] [phone]              Patient's Medications   Discharge Prescriptions    No medications on file       No discharge procedures on file.    PDMP Review       None            ED Provider  Electronically Signed by             Charleen Downey,   05/12/24 1434       Charleen Downey,   05/12/24 1600